# Patient Record
Sex: FEMALE | Race: WHITE | Employment: FULL TIME | URBAN - METROPOLITAN AREA
[De-identification: names, ages, dates, MRNs, and addresses within clinical notes are randomized per-mention and may not be internally consistent; named-entity substitution may affect disease eponyms.]

---

## 2017-01-05 PROCEDURE — 82533 TOTAL CORTISOL: CPT | Performed by: FAMILY MEDICINE

## 2017-01-05 PROCEDURE — 84480 ASSAY TRIIODOTHYRONINE (T3): CPT | Performed by: FAMILY MEDICINE

## 2017-04-03 ENCOUNTER — HOSPITAL ENCOUNTER (OUTPATIENT)
Dept: MAMMOGRAPHY | Age: 52
Discharge: HOME | End: 2017-04-03
Attending: OBSTETRICS & GYNECOLOGY
Payer: COMMERCIAL

## 2017-04-03 DIAGNOSIS — Z12.31 VISIT FOR SCREENING MAMMOGRAM: ICD-10-CM

## 2017-04-03 PROCEDURE — 77067 SCR MAMMO BI INCL CAD: CPT

## 2017-06-29 PROCEDURE — 83002 ASSAY OF GONADOTROPIN (LH): CPT | Performed by: FAMILY MEDICINE

## 2017-06-29 PROCEDURE — 84144 ASSAY OF PROGESTERONE: CPT | Performed by: FAMILY MEDICINE

## 2017-06-29 PROCEDURE — 84480 ASSAY TRIIODOTHYRONINE (T3): CPT | Performed by: FAMILY MEDICINE

## 2017-06-29 PROCEDURE — 83001 ASSAY OF GONADOTROPIN (FSH): CPT | Performed by: FAMILY MEDICINE

## 2017-06-29 PROCEDURE — 82533 TOTAL CORTISOL: CPT | Performed by: FAMILY MEDICINE

## 2017-06-29 PROCEDURE — 82670 ASSAY OF TOTAL ESTRADIOL: CPT | Performed by: FAMILY MEDICINE

## 2017-06-29 PROCEDURE — 82627 DEHYDROEPIANDROSTERONE: CPT | Performed by: FAMILY MEDICINE

## 2017-07-24 PROBLEM — E03.8 SUBCLINICAL HYPOTHYROIDISM: Status: ACTIVE | Noted: 2017-07-24

## 2017-08-22 PROBLEM — H81.11 BENIGN PAROXYSMAL VERTIGO OF RIGHT EAR: Status: ACTIVE | Noted: 2017-08-22

## 2017-12-21 ENCOUNTER — OFFICE VISIT (OUTPATIENT)
Dept: OBGYN CLINIC | Facility: CLINIC | Age: 52
End: 2017-12-21

## 2017-12-21 VITALS
BODY MASS INDEX: 28.66 KG/M2 | WEIGHT: 172 LBS | RESPIRATION RATE: 16 BRPM | HEIGHT: 65 IN | DIASTOLIC BLOOD PRESSURE: 80 MMHG | SYSTOLIC BLOOD PRESSURE: 130 MMHG | HEART RATE: 88 BPM

## 2017-12-21 DIAGNOSIS — Z01.419 ENCOUNTER FOR WELL WOMAN EXAM WITH ROUTINE GYNECOLOGICAL EXAM: Primary | ICD-10-CM

## 2017-12-21 DIAGNOSIS — Z12.39 BREAST CANCER SCREENING: ICD-10-CM

## 2017-12-21 DIAGNOSIS — N39.3 STRESS INCONTINENCE: ICD-10-CM

## 2017-12-21 PROCEDURE — 87624 HPV HI-RISK TYP POOLED RSLT: CPT | Performed by: OBSTETRICS & GYNECOLOGY

## 2017-12-21 PROCEDURE — 88175 CYTOPATH C/V AUTO FLUID REDO: CPT | Performed by: OBSTETRICS & GYNECOLOGY

## 2017-12-21 PROCEDURE — 99396 PREV VISIT EST AGE 40-64: CPT | Performed by: OBSTETRICS & GYNECOLOGY

## 2017-12-21 NOTE — PROGRESS NOTES
Sadie Ivory is a 47year old female  No LMP recorded. Patient has had an ablation.  Patient presents with:  Wellness Visit: annual  .patient has been coughing on and off for 9 months, no improvement with treatment, seeing pulmonologist now  [facility]       SOCIAL HISTORY:    Social History  Social History   Marital status:   Spouse name: N/A    Years of education: N/A  Number of children: N/A     Occupational History  None on file     Social History Main Topics   Smoking status:  Form Comment:Used many years ago and was sick      Review of Systems:  Constitutional:  Denies fatigue, night sweats, hot flashes  Eyes:  denies blurred or double vision  Cardiovascular:  denies chest pain or palpitations  Respiratory:  denies shortness of gregory THINPREP PAP SMEAR B; Future  -     HPV HIGH RISK , THIN PREP COLLECTION; Future  -     Cancel: OP REFERRAL TO [facility]    Breast cancer screening  -     [facility] SCREENING BILAT (CPT=77067);  Future  -     Cancel: OP REFERRAL TO [facility]

## 2018-02-26 PROCEDURE — 83001 ASSAY OF GONADOTROPIN (FSH): CPT | Performed by: FAMILY MEDICINE

## 2018-02-26 PROCEDURE — 36415 COLL VENOUS BLD VENIPUNCTURE: CPT | Performed by: FAMILY MEDICINE

## 2018-02-28 ENCOUNTER — TELEPHONE (OUTPATIENT)
Dept: OBGYN CLINIC | Facility: CLINIC | Age: 53
End: 2018-02-28

## 2018-02-28 NOTE — TELEPHONE ENCOUNTER
Per pt she had her blood work done on Monday by dr Jeb Layton. She got her results and her fsh levels are elevated. Her pcp advised her to talk to dr CAMACHO to see what she recommends to do next. Please advise and call pt.  Thanks

## 2018-03-01 ENCOUNTER — OFFICE VISIT (OUTPATIENT)
Dept: OBGYN CLINIC | Facility: CLINIC | Age: 53
End: 2018-03-01

## 2018-03-01 VITALS
HEART RATE: 72 BPM | BODY MASS INDEX: 29.77 KG/M2 | SYSTOLIC BLOOD PRESSURE: 116 MMHG | HEIGHT: 63 IN | WEIGHT: 168 LBS | DIASTOLIC BLOOD PRESSURE: 64 MMHG

## 2018-03-01 DIAGNOSIS — Z01.419 WELL WOMAN EXAM WITH ROUTINE GYNECOLOGICAL EXAM: Primary | ICD-10-CM

## 2018-03-01 PROCEDURE — 99213 OFFICE O/P EST LOW 20 MIN: CPT | Performed by: OBSTETRICS & GYNECOLOGY

## 2018-03-01 RX ORDER — ESTRADIOL 1 MG/1
1 TABLET ORAL DAILY
Qty: 90 TABLET | Refills: 4 | Status: SHIPPED | OUTPATIENT
Start: 2018-03-01 | End: 2019-01-04

## 2018-03-01 NOTE — PROGRESS NOTES
Planes of hot flashes and night sweats unable to sleep sleep. Women's health initiative risk benefit options were discussed with her she wishes to go for hormone replacement therapy.

## 2018-04-04 ENCOUNTER — HOSPITAL ENCOUNTER (OUTPATIENT)
Dept: MAMMOGRAPHY | Age: 53
Discharge: HOME | End: 2018-04-04
Attending: OBSTETRICS & GYNECOLOGY
Payer: COMMERCIAL

## 2018-04-04 DIAGNOSIS — Z12.39 BREAST CANCER SCREENING: ICD-10-CM

## 2018-04-04 PROCEDURE — 77067 SCR MAMMO BI INCL CAD: CPT | Performed by: OBSTETRICS & GYNECOLOGY

## 2018-05-09 PROBLEM — H81.11 BPPV (BENIGN PAROXYSMAL POSITIONAL VERTIGO), RIGHT: Status: ACTIVE | Noted: 2018-05-09

## 2018-07-19 PROCEDURE — 82627 DEHYDROEPIANDROSTERONE: CPT | Performed by: FAMILY MEDICINE

## 2018-07-19 PROCEDURE — 83001 ASSAY OF GONADOTROPIN (FSH): CPT | Performed by: FAMILY MEDICINE

## 2018-07-19 PROCEDURE — 84144 ASSAY OF PROGESTERONE: CPT | Performed by: FAMILY MEDICINE

## 2018-07-19 PROCEDURE — 82670 ASSAY OF TOTAL ESTRADIOL: CPT | Performed by: FAMILY MEDICINE

## 2018-07-19 PROCEDURE — 84480 ASSAY TRIIODOTHYRONINE (T3): CPT | Performed by: FAMILY MEDICINE

## 2018-07-19 PROCEDURE — 83002 ASSAY OF GONADOTROPIN (LH): CPT | Performed by: FAMILY MEDICINE

## 2018-07-19 PROCEDURE — 82533 TOTAL CORTISOL: CPT | Performed by: FAMILY MEDICINE

## 2018-08-17 PROCEDURE — 87329 GIARDIA AG IA: CPT | Performed by: INTERNAL MEDICINE

## 2018-08-17 PROCEDURE — 87272 CRYPTOSPORIDIUM AG IF: CPT | Performed by: INTERNAL MEDICINE

## 2018-08-17 PROCEDURE — 87207 SMEAR SPECIAL STAIN: CPT | Performed by: INTERNAL MEDICINE

## 2018-08-17 PROCEDURE — 89055 LEUKOCYTE ASSESSMENT FECAL: CPT | Performed by: INTERNAL MEDICINE

## 2018-08-17 PROCEDURE — 87046 STOOL CULTR AEROBIC BACT EA: CPT | Performed by: INTERNAL MEDICINE

## 2018-08-17 PROCEDURE — 36415 COLL VENOUS BLD VENIPUNCTURE: CPT | Performed by: INTERNAL MEDICINE

## 2018-08-17 PROCEDURE — 87015 SPECIMEN INFECT AGNT CONCNTJ: CPT | Performed by: INTERNAL MEDICINE

## 2018-08-17 PROCEDURE — 87045 FECES CULTURE AEROBIC BACT: CPT | Performed by: INTERNAL MEDICINE

## 2018-09-04 PROCEDURE — 88305 TISSUE EXAM BY PATHOLOGIST: CPT | Performed by: INTERNAL MEDICINE

## 2018-11-21 ENCOUNTER — HOSPITAL ENCOUNTER (EMERGENCY)
Facility: HOSPITAL | Age: 53
Discharge: HOME | End: 2018-11-21
Attending: EMERGENCY MEDICINE
Payer: COMMERCIAL

## 2018-11-21 VITALS
RESPIRATION RATE: 20 BRPM | BODY MASS INDEX: 28.35 KG/M2 | HEART RATE: 87 BPM | OXYGEN SATURATION: 97 % | HEIGHT: 63 IN | SYSTOLIC BLOOD PRESSURE: 140 MMHG | WEIGHT: 160 LBS | DIASTOLIC BLOOD PRESSURE: 95 MMHG

## 2018-11-21 DIAGNOSIS — S51.812A LACERATION OF LEFT FOREARM, INITIAL ENCOUNTER: Primary | ICD-10-CM

## 2018-11-21 PROCEDURE — 99283 EMERGENCY DEPT VISIT LOW MDM: CPT

## 2018-11-21 PROCEDURE — 99282 EMERGENCY DEPT VISIT SF MDM: CPT

## 2018-11-21 PROCEDURE — 12002 RPR S/N/AX/GEN/TRNK2.6-7.5CM: CPT

## 2018-11-22 NOTE — ED INITIAL ASSESSMENT (HPI)
Accidental laceration to the right forearm from a ceramic dish approximately 45 minutes prior to arrival. Bleeding well controlled.

## 2018-11-22 NOTE — ED PROVIDER NOTES
Patient Seen in: [facility] Emergency Department    History   Patient presents with:  Laceration Abrasion (integumentary)    Stated Complaint: laceration right wrist    HPI    69-year-old female, otherwise healthy, here for evaluation of a laceratio 2002        Years since quittin.8      Smokeless tobacco: Never Used    Alcohol use: Yes      Comment: wine q day    Drug use: No      Review of Systems    Positive for stated complaint: laceration right wrist  Other systems are as noted in HPI. precautions discussed in detail.             Disposition and Plan     Clinical Impression:  Laceration of left forearm, initial encounter  (primary encounter diagnosis)    Disposition:  Discharge  11/21/2018 11:23 pm    Follow-up:  [facility] Emergency

## 2018-11-28 ENCOUNTER — HOSPITAL ENCOUNTER (OUTPATIENT)
Age: 53
Discharge: HOME | End: 2018-11-28
Attending: FAMILY MEDICINE
Payer: COMMERCIAL

## 2018-11-28 VITALS
RESPIRATION RATE: 16 BRPM | DIASTOLIC BLOOD PRESSURE: 91 MMHG | BODY MASS INDEX: 28 KG/M2 | WEIGHT: 160.06 LBS | SYSTOLIC BLOOD PRESSURE: 141 MMHG | TEMPERATURE: 99 F | HEART RATE: 74 BPM | OXYGEN SATURATION: 100 %

## 2018-11-28 DIAGNOSIS — Z48.02 ENCOUNTER FOR REMOVAL OF SUTURES: Primary | ICD-10-CM

## 2018-11-28 NOTE — ED PROVIDER NOTES
Patient Seen in: [facility]    History   Patient presents with:   Follow - Up: suture removal    Stated Complaint: suture removal     HPI    46year old female presents for suture removal. States she sustained laceration on 2002        Years since quittin.9      Smokeless tobacco: Never Used    Alcohol use: Yes      Comment: wine q day    Drug use: No      Review of Systems    Positive for stated complaint: suture removal   Other systems are as noted in HPI.   Constit

## 2019-01-04 ENCOUNTER — OFFICE VISIT (OUTPATIENT)
Dept: OBGYN CLINIC | Facility: CLINIC | Age: 54
End: 2019-01-04
Payer: COMMERCIAL

## 2019-01-04 VITALS
BODY MASS INDEX: 28.7 KG/M2 | HEIGHT: 63 IN | WEIGHT: 162 LBS | SYSTOLIC BLOOD PRESSURE: 118 MMHG | HEART RATE: 88 BPM | DIASTOLIC BLOOD PRESSURE: 68 MMHG

## 2019-01-04 DIAGNOSIS — Z12.4 CERVICAL CANCER SCREENING: ICD-10-CM

## 2019-01-04 DIAGNOSIS — Z12.39 BREAST CANCER SCREENING: ICD-10-CM

## 2019-01-04 DIAGNOSIS — Z01.419 ENCOUNTER FOR WELL WOMAN EXAM WITH ROUTINE GYNECOLOGICAL EXAM: Primary | ICD-10-CM

## 2019-01-04 PROCEDURE — 88175 CYTOPATH C/V AUTO FLUID REDO: CPT | Performed by: OBSTETRICS & GYNECOLOGY

## 2019-01-04 PROCEDURE — 87624 HPV HI-RISK TYP POOLED RSLT: CPT | Performed by: OBSTETRICS & GYNECOLOGY

## 2019-01-04 PROCEDURE — 99396 PREV VISIT EST AGE 40-64: CPT | Performed by: OBSTETRICS & GYNECOLOGY

## 2019-01-04 RX ORDER — ESTRADIOL 1 MG/1
1 TABLET ORAL DAILY
Qty: 90 TABLET | Refills: 4 | Status: SHIPPED | OUTPATIENT
Start: 2019-01-04 | End: 2019-08-15

## 2019-01-06 LAB — HPV I/H RISK 1 DNA SPEC QL NAA+PROBE: NEGATIVE

## 2019-01-08 PROBLEM — J45.21 MILD INTERMITTENT REACTIVE AIRWAY DISEASE WITH ACUTE EXACERBATION (HCC): Status: ACTIVE | Noted: 2019-01-08

## 2019-01-08 PROBLEM — J45.21 MILD INTERMITTENT REACTIVE AIRWAY DISEASE WITH ACUTE EXACERBATION: Status: ACTIVE | Noted: 2019-01-08

## 2019-03-06 RX ORDER — ESTRADIOL 1 MG/1
TABLET ORAL
Qty: 90 TABLET | Refills: 0 | Status: SHIPPED | OUTPATIENT
Start: 2019-03-06 | End: 2019-06-19

## 2019-04-05 ENCOUNTER — HOSPITAL ENCOUNTER (OUTPATIENT)
Dept: MAMMOGRAPHY | Age: 54
Discharge: HOME | End: 2019-04-05
Attending: OBSTETRICS & GYNECOLOGY
Payer: COMMERCIAL

## 2019-04-05 DIAGNOSIS — Z12.39 BREAST CANCER SCREENING: ICD-10-CM

## 2019-04-05 PROCEDURE — 77067 SCR MAMMO BI INCL CAD: CPT | Performed by: OBSTETRICS & GYNECOLOGY

## 2019-04-05 PROCEDURE — 77063 BREAST TOMOSYNTHESIS BI: CPT | Performed by: OBSTETRICS & GYNECOLOGY

## 2019-04-29 PROBLEM — R07.89 ATYPICAL CHEST PAIN: Status: ACTIVE | Noted: 2019-04-29

## 2019-04-29 PROBLEM — Z82.49 FAMILY HISTORY OF EARLY CAD: Status: ACTIVE | Noted: 2019-04-29

## 2019-06-20 RX ORDER — ESTRADIOL 1 MG/1
TABLET ORAL
Qty: 90 TABLET | Refills: 1 | Status: SHIPPED | OUTPATIENT
Start: 2019-06-20 | End: 2019-08-15

## 2020-03-03 ENCOUNTER — TELEPHONE (OUTPATIENT)
Dept: OBGYN CLINIC | Facility: CLINIC | Age: 55
End: 2020-03-03

## 2020-03-03 NOTE — TELEPHONE ENCOUNTER
Just called pt to let her know she missed her appt on 03- @ 1:30 pm w/ dr CAMACHO. Lmtcbtrs if needed.  Thanks

## 2020-05-27 ENCOUNTER — OFFICE VISIT (OUTPATIENT)
Dept: OBGYN CLINIC | Facility: CLINIC | Age: 55
End: 2020-05-27
Payer: COMMERCIAL

## 2020-05-27 VITALS
BODY MASS INDEX: 26 KG/M2 | HEART RATE: 89 BPM | DIASTOLIC BLOOD PRESSURE: 72 MMHG | SYSTOLIC BLOOD PRESSURE: 120 MMHG | HEIGHT: 63 IN

## 2020-05-27 DIAGNOSIS — Z12.31 ENCOUNTER FOR SCREENING MAMMOGRAM FOR MALIGNANT NEOPLASM OF BREAST: Primary | ICD-10-CM

## 2020-05-27 PROCEDURE — 99396 PREV VISIT EST AGE 40-64: CPT | Performed by: OBSTETRICS & GYNECOLOGY

## 2020-05-27 NOTE — H&P
[facility]  Obstetrics and Gynecology   History & Physical  Established    Chief complaint: No chief complaint on file. Subjective:     HPI: Raven Lam is a 53year old  with LMP No LMP recorded. Patient has had an ablation. mouth nightly., Disp: 90 tablet, Rfl: 3  Progesterone Micronized 200 MG Oral Cap, TAKE 1 CAPSULE BY MOUTH EVERY NIGHT., Disp: 90 capsule, Rfl: 3  estradiol (ESTRACE) 1 MG Oral Tab, Take 1 tablet (1 mg total) by mouth daily. , Disp: 90 tablet, Rfl: 3  Dapson 12/14, 12/13, 12/13, 11/11, 11/10    negative hpv negative   • Perirectal abscess     Multiple surgeries done   • Sprain of foot joint, left, initial encounter 9/7/2016   • Subclinical hypothyroidism 7/24/2017   • Wears glasses     PRN       PSH:  Past Angelique Alcohol use: Yes        Frequency: Monthly or less        Drinks per session: 3 or 4        Binge frequency: Less than monthly        Comment: wine q day      Drug use: No      Sexual activity: Not on file    Lifestyle      Physical activity:        Days p 07/19/2018    MCV 96.8 07/19/2018    MCH 31.6 07/19/2018    MCHC 32.6 07/19/2018    RDW 12.5 07/19/2018     07/19/2018        Lab Results   Component Value Date    GLU 93 07/19/2018    BUN 11 07/19/2018    CREATSERUM 0.84 07/19/2018    GFR >59 03/17

## 2020-05-27 NOTE — PROGRESS NOTES
Justin Nam is a 53year old female  No LMP recorded. Patient has had an ablation. Patient presents with:  Wellness Visit: hot flashes, middle of night pt having leg cramping, pt wasnts to go over lab work that was done on 20  .      She i negative   • Perirectal abscess     Multiple surgeries done   • Sprain of foot joint, left, initial encounter 9/7/2016   • Subclinical hypothyroidism 7/24/2017   • Wears glasses     PRN       SURGICAL HISTORY:  Past Surgical History:   Procedure Laterality 1/15/1978        Quit date: 2002        Years since quittin.4      Smokeless tobacco: Never Used    Substance and Sexual Activity      Alcohol use: Yes        Frequency: Monthly or less        Drinks per session: 3 or 4        Binge frequency: Les Cap, Take 1 tablet by mouth nightly., Disp: 30 capsule, Rfl: 11  •  Fluticasone Furoate (ARNUITY ELLIPTA) 100 MCG/ACT Inhalation Aerosol Powder, Breath Activated, Inhale 1 puff into the lungs daily. , Disp: 1 each, Rfl: 2  •  Omeprazole 40 MG Oral Capsule D constipation  Genitourinary:  denies dysuria, incontinence, abnormal vaginal discharge, vaginal itching  Skin/Breast:  Denies any breast pain, lumps, or discharge. Neurological:  denies headaches, extremity weakness or numbness.       PHYSICAL EXAM:   Con

## 2020-06-01 ENCOUNTER — TELEPHONE (OUTPATIENT)
Dept: OBGYN CLINIC | Facility: CLINIC | Age: 55
End: 2020-06-01

## 2020-06-12 ENCOUNTER — TELEPHONE (OUTPATIENT)
Dept: OBGYN CLINIC | Facility: CLINIC | Age: 55
End: 2020-06-12

## 2020-06-12 DIAGNOSIS — N95.1 MENOPAUSAL SYMPTOMS: ICD-10-CM

## 2020-06-18 NOTE — TELEPHONE ENCOUNTER
Patient informed that the PA for Freddy Morataya was denied. She would like to know if she should resume taking Estradiol tabs and Progesterone plus Estrovera. She has also ordered Relizen to try. Will check with Dr. Moses Barboza and call patient back.

## 2020-06-24 RX ORDER — ESTRADIOL 1 MG/1
1 TABLET ORAL DAILY
Qty: 90 TABLET | Refills: 3 | Status: SHIPPED | OUTPATIENT
Start: 2020-06-24 | End: 2021-06-21

## 2020-06-25 ENCOUNTER — HOSPITAL ENCOUNTER (OUTPATIENT)
Dept: MAMMOGRAPHY | Age: 55
Discharge: HOME | End: 2020-06-25
Attending: OBSTETRICS & GYNECOLOGY
Payer: COMMERCIAL

## 2020-06-25 DIAGNOSIS — Z12.31 ENCOUNTER FOR SCREENING MAMMOGRAM FOR MALIGNANT NEOPLASM OF BREAST: ICD-10-CM

## 2020-06-25 PROCEDURE — 77063 BREAST TOMOSYNTHESIS BI: CPT | Performed by: OBSTETRICS & GYNECOLOGY

## 2020-06-25 PROCEDURE — 77067 SCR MAMMO BI INCL CAD: CPT | Performed by: OBSTETRICS & GYNECOLOGY

## 2021-04-09 DIAGNOSIS — Z23 NEED FOR VACCINATION: ICD-10-CM

## 2021-05-14 ENCOUNTER — TELEPHONE (OUTPATIENT)
Dept: OBGYN CLINIC | Facility: CLINIC | Age: 56
End: 2021-05-14

## 2021-05-14 NOTE — TELEPHONE ENCOUNTER
Pt called to schedule her WWE and asked if the nurse can call her back. Pt states she has been dealing with skin irritation/pimples on her bikini line and the products she is using just are not helping. Please advise.

## 2021-05-14 NOTE — TELEPHONE ENCOUNTER
Spoke with patient. She has been dealing with scabies for a while is now taking an oral medication from her dermatologist that appears to be helping. She is having purplish boils and \"breakouts in her bikini area that she would like a recommendation for.

## 2021-06-21 ENCOUNTER — OFFICE VISIT (OUTPATIENT)
Dept: OBGYN CLINIC | Facility: CLINIC | Age: 56
End: 2021-06-21
Payer: COMMERCIAL

## 2021-06-21 VITALS
HEIGHT: 63 IN | DIASTOLIC BLOOD PRESSURE: 62 MMHG | SYSTOLIC BLOOD PRESSURE: 124 MMHG | BODY MASS INDEX: 28 KG/M2 | HEART RATE: 87 BPM

## 2021-06-21 DIAGNOSIS — Z12.31 ENCOUNTER FOR SCREENING MAMMOGRAM FOR BREAST CANCER: Primary | ICD-10-CM

## 2021-06-21 DIAGNOSIS — Z01.419 WELL WOMAN EXAM WITH ROUTINE GYNECOLOGICAL EXAM: ICD-10-CM

## 2021-06-21 DIAGNOSIS — N95.1 MENOPAUSAL SYMPTOMS: ICD-10-CM

## 2021-06-21 PROCEDURE — 99396 PREV VISIT EST AGE 40-64: CPT | Performed by: OBSTETRICS & GYNECOLOGY

## 2021-06-21 PROCEDURE — 3074F SYST BP LT 130 MM HG: CPT | Performed by: OBSTETRICS & GYNECOLOGY

## 2021-06-21 PROCEDURE — 3008F BODY MASS INDEX DOCD: CPT | Performed by: OBSTETRICS & GYNECOLOGY

## 2021-06-21 PROCEDURE — 3078F DIAST BP <80 MM HG: CPT | Performed by: OBSTETRICS & GYNECOLOGY

## 2021-06-21 RX ORDER — PROGESTERONE 200 MG/1
200 CAPSULE ORAL AS DIRECTED
Qty: 90 CAPSULE | Refills: 5 | Status: SHIPPED | OUTPATIENT
Start: 2021-06-21 | End: 2021-11-15

## 2021-06-21 RX ORDER — ESTRADIOL 1 MG/1
1 TABLET ORAL DAILY
Qty: 90 TABLET | Refills: 3 | Status: SHIPPED | OUTPATIENT
Start: 2021-06-21

## 2021-06-21 NOTE — PROGRESS NOTES
Alison Trevino is a 63year old female  No LMP recorded. Patient has had an ablation. Patient presents with:  Wellness Visit  . Bleeding no spotting. She had scabies. It took 6 weeks to be treated. She has had 2 colonoscopies.   She is compl Colonoscopy & polypectomy  10/14    polyp- repeat 5 yrs (adenoma)   • Colonoscopy,biopsy N/A 10/14/2014    Procedure: ESOPHAGOGASTRODUODENOSCOPY, COLONOSCOPY, POSSIBLE BIOPSY, POSSIBLE POLYPECTOMY 63369,71770;  Surgeon: Reggie Goodell, MD;  Location:  Concern: Not Asked        Special Diet: Not Asked        Back Care: Not Asked        Exercise: Yes          5 d/wk        Bike Helmet: Not Asked        Seat Belt: Yes        Self-Exams: Not Asked    Social History Narrative      Not on file    Social Deter 5 % External Cream, Apply TOP from neck down at night and then wash off in the AM. . Repeat in a week if necessary. , Disp: 60 g, Rfl: 1  •  Lindane 1 % External Shampoo, Apply to skin from neck down at night and shower in the AM 10-14 hours later. , Disp: 6 numbness.       PHYSICAL EXAM:   Constitutional: well developed, well nourished  Head/Face: normocephalic  Neck/Thyroid: thyroid symmetric, no thyromegaly, no nodules, no adenopathy  Breast: normal without palpable masses, tenderness, asymmetry, nipple disc

## 2021-07-09 ENCOUNTER — TELEPHONE (OUTPATIENT)
Dept: OBGYN CLINIC | Facility: CLINIC | Age: 56
End: 2021-07-09

## 2021-07-09 NOTE — TELEPHONE ENCOUNTER
April called from [facility] asking for clarification on a prescription sent in for pt. Please call to advise.

## 2021-07-19 ENCOUNTER — HOSPITAL ENCOUNTER (OUTPATIENT)
Dept: MAMMOGRAPHY | Age: 56
Discharge: HOME | End: 2021-07-19
Attending: OBSTETRICS & GYNECOLOGY
Payer: COMMERCIAL

## 2021-07-19 DIAGNOSIS — Z12.31 ENCOUNTER FOR SCREENING MAMMOGRAM FOR BREAST CANCER: ICD-10-CM

## 2021-07-19 PROCEDURE — 77063 BREAST TOMOSYNTHESIS BI: CPT | Performed by: OBSTETRICS & GYNECOLOGY

## 2021-07-19 PROCEDURE — 77067 SCR MAMMO BI INCL CAD: CPT | Performed by: OBSTETRICS & GYNECOLOGY

## 2021-07-20 NOTE — PROGRESS NOTES
Patient notified per radiology department. They will call the patient back to schedule an Appointment for additional views.

## 2021-07-23 ENCOUNTER — HOSPITAL ENCOUNTER (OUTPATIENT)
Dept: MAMMOGRAPHY | Facility: HOSPITAL | Age: 56
Discharge: HOME | End: 2021-07-23
Attending: OBSTETRICS & GYNECOLOGY
Payer: COMMERCIAL

## 2021-07-23 DIAGNOSIS — R92.2 INCONCLUSIVE MAMMOGRAM: ICD-10-CM

## 2021-07-23 PROCEDURE — 77061 BREAST TOMOSYNTHESIS UNI: CPT | Performed by: OBSTETRICS & GYNECOLOGY

## 2021-07-23 PROCEDURE — 76642 ULTRASOUND BREAST LIMITED: CPT | Performed by: OBSTETRICS & GYNECOLOGY

## 2021-07-23 PROCEDURE — 77065 DX MAMMO INCL CAD UNI: CPT | Performed by: OBSTETRICS & GYNECOLOGY

## 2021-07-30 ENCOUNTER — TELEPHONE (OUTPATIENT)
Dept: OBGYN CLINIC | Facility: CLINIC | Age: 56
End: 2021-07-30

## 2021-07-30 DIAGNOSIS — R92.2 BREAST DENSITY: ICD-10-CM

## 2021-07-30 DIAGNOSIS — R92.2 INCONCLUSIVE MAMMOGRAM: Primary | ICD-10-CM

## 2021-07-30 DIAGNOSIS — R92.8 ABNORMAL ULTRASOUND OF BREAST: ICD-10-CM

## 2021-07-30 NOTE — TELEPHONE ENCOUNTER
Request for breast mri order received. Ordered placed and routed for signature. Per Lady Tono RN will need PA.

## 2021-08-13 NOTE — TELEPHONE ENCOUNTER
PA obtained, order number [id number].  Left message for [facility] with authorization number

## 2021-08-13 NOTE — TELEPHONE ENCOUNTER
Beck Paula called to request PA for breast MRI scheduled for Monday. Call Beck Paula pack with authorization once received. Transporter

## 2021-08-16 ENCOUNTER — HOSPITAL ENCOUNTER (OUTPATIENT)
Dept: MRI IMAGING | Facility: HOSPITAL | Age: 56
Discharge: HOME | End: 2021-08-16
Attending: OBSTETRICS & GYNECOLOGY
Payer: COMMERCIAL

## 2021-08-16 DIAGNOSIS — R92.2 INCONCLUSIVE MAMMOGRAM: ICD-10-CM

## 2021-08-16 PROCEDURE — 77049 MRI BREAST C-+ W/CAD BI: CPT | Performed by: OBSTETRICS & GYNECOLOGY

## 2021-08-16 PROCEDURE — A9575 INJ GADOTERATE MEGLUMI 0.1ML: HCPCS | Performed by: OBSTETRICS & GYNECOLOGY

## 2021-08-17 ENCOUNTER — TELEPHONE (OUTPATIENT)
Dept: CT IMAGING | Facility: HOSPITAL | Age: 56
End: 2021-08-17

## 2021-08-17 DIAGNOSIS — R92.8 ABNORMAL MRI, BREAST: Primary | ICD-10-CM

## 2021-08-17 NOTE — PROGRESS NOTES
Patient notified of mammogram results and recommendation for biopsy per Breast Care Coordinator. Order placed.

## 2021-08-17 NOTE — TELEPHONE ENCOUNTER
This Breast Care RN assisted Dr. John Morrison (via MRI recommendation) for a Right Breast 3D Tony stereotactic biopsy for architectural distortion. Procedure reviewed and all questions answered. Emotional and educational support given.    On the day of the biops

## 2021-09-09 ENCOUNTER — HOSPITAL ENCOUNTER (OUTPATIENT)
Dept: MAMMOGRAPHY | Facility: HOSPITAL | Age: 56
Discharge: HOME | End: 2021-09-09
Attending: OBSTETRICS & GYNECOLOGY
Payer: COMMERCIAL

## 2021-09-09 DIAGNOSIS — R92.8 ABNORMAL MRI, BREAST: ICD-10-CM

## 2021-09-09 PROCEDURE — 88305 TISSUE EXAM BY PATHOLOGIST: CPT | Performed by: OBSTETRICS & GYNECOLOGY

## 2021-09-09 PROCEDURE — 19499 UNLISTED PROCEDURE BREAST: CPT | Performed by: OBSTETRICS & GYNECOLOGY

## 2021-09-09 PROCEDURE — 88341 IMHCHEM/IMCYTCHM EA ADD ANTB: CPT | Performed by: OBSTETRICS & GYNECOLOGY

## 2021-09-09 PROCEDURE — 88342 IMHCHEM/IMCYTCHM 1ST ANTB: CPT | Performed by: OBSTETRICS & GYNECOLOGY

## 2021-09-13 NOTE — PROGRESS NOTES
Patient aware of right breast Biopsy results and recommendation to follow up with Dr Dionne Luther. Patient verbalized understanding.

## 2021-09-13 NOTE — IMAGING NOTE
1036 : Spoke with Nadia Estevez post stereotactic right breast biopsy. Ms. See Watson identified with name and date of birth. Introduced myself as breast care coordinator. Reinforced post biopsy care and instruction.   Ms. See Watson  denies any issues

## 2021-09-27 ENCOUNTER — OFFICE VISIT (OUTPATIENT)
Dept: SURGERY | Facility: CLINIC | Age: 56
End: 2021-09-27
Payer: COMMERCIAL

## 2021-09-27 VITALS — TEMPERATURE: 97 F

## 2021-09-27 DIAGNOSIS — N64.89 RADIAL SCAR OF RIGHT BREAST: Primary | ICD-10-CM

## 2021-09-27 PROCEDURE — 99243 OFF/OP CNSLTJ NEW/EST LOW 30: CPT | Performed by: SURGERY

## 2021-09-27 NOTE — H&P
New Patient Visit Note       Active Problems      1. Radial scar of right breast        Chief Complaint   Patient presents with:  Breast Problem: [facility] PT ref by Dr Chuckie Hough for RT breast radial scar -- Denies pain and discomfort.  States still bruised from the 11/10    negative hpv negative   • Perirectal abscess     Multiple surgeries done   • Sprain of foot joint, left, initial encounter 9/7/2016   • Subclinical hypothyroidism 7/24/2017   • Wears glasses     PRN     Past Surgical History:   Procedure Lateralit Smokeless tobacco: Never Used    Vaping Use      Vaping Use: Never used    Substance and Sexual Activity      Alcohol use: Yes        Comment: wine q day      Drug use: No    Other Topics      Concerns:        Caffeine Concern: No          coffee        Ex MCG/ACT Inhalation Aero Soln, INHALE 2 PUFFS INTO THE LUNGS EVERY 4 HOURS AS NEEDED FOR WHEEZING, Disp: 8.5 g, Rfl: 1  •  Calcium Carbonate-Vitamin D (CALCIUM + D OR), Take by mouth daily. , Disp: , Rfl:   •  Cyanocobalamin (B-12 OR), Take by mouth daily. equal, round, and reactive to light. Neck:      Vascular: No JVD. Trachea: Trachea normal.   Cardiovascular:      Rate and Rhythm: Normal rate and regular rhythm. Heart sounds: S1 normal and S2 normal. No murmur heard.       Pulmonary:      Effo vague nodularity   posterior to the nipple.  Ultrasound demonstrates a benign cyst 0500 hours 5 mm x 5 mm x 3 mm.  There is a benign cyst 0300 hours retroareolar 5 mm x 3 mm x 3 mm.       MRI is recommended for further evaluation of spiculated density with correlate to the finding of architectural distortion seen on 3D tomosynthesis.  Therefore 3D tomographic stereotactic biopsy of the right breast area of architectural distortion is recommended for further evaluation.       BI-RADS CATEGORY:   DIAGNOSTIC CA

## 2021-11-04 ENCOUNTER — TELEPHONE (OUTPATIENT)
Dept: GENERAL RADIOLOGY | Facility: HOSPITAL | Age: 56
End: 2021-11-04

## 2021-11-15 RX ORDER — ACETAMINOPHEN 500 MG
1000 TABLET ORAL ONCE
Status: CANCELLED | OUTPATIENT
Start: 2021-11-15 | End: 2021-11-15

## 2021-11-16 ENCOUNTER — LAB ENCOUNTER (OUTPATIENT)
Dept: LAB | Facility: HOSPITAL | Age: 56
End: 2021-11-16
Attending: SURGERY
Payer: COMMERCIAL

## 2021-11-16 DIAGNOSIS — N64.89 RADIAL SCAR OF RIGHT BREAST: ICD-10-CM

## 2021-11-18 ENCOUNTER — HOSPITAL ENCOUNTER (OUTPATIENT)
Facility: HOSPITAL | Age: 56
Setting detail: HOSPITAL OUTPATIENT SURGERY
Discharge: HOME | End: 2021-11-18
Attending: SURGERY | Admitting: SURGERY
Payer: COMMERCIAL

## 2021-11-18 ENCOUNTER — HOSPITAL ENCOUNTER (OUTPATIENT)
Dept: MAMMOGRAPHY | Facility: HOSPITAL | Age: 56
Discharge: HOME | End: 2021-11-18
Attending: SURGERY
Payer: COMMERCIAL

## 2021-11-18 ENCOUNTER — ANESTHESIA (OUTPATIENT)
Dept: SURGERY | Facility: HOSPITAL | Age: 56
End: 2021-11-18
Payer: COMMERCIAL

## 2021-11-18 ENCOUNTER — ANESTHESIA EVENT (OUTPATIENT)
Dept: SURGERY | Facility: HOSPITAL | Age: 56
End: 2021-11-18
Payer: COMMERCIAL

## 2021-11-18 VITALS
BODY MASS INDEX: 30.08 KG/M2 | DIASTOLIC BLOOD PRESSURE: 89 MMHG | HEIGHT: 63 IN | WEIGHT: 169.75 LBS | SYSTOLIC BLOOD PRESSURE: 135 MMHG | TEMPERATURE: 98 F | HEART RATE: 93 BPM | RESPIRATION RATE: 14 BRPM | OXYGEN SATURATION: 96 %

## 2021-11-18 DIAGNOSIS — N64.89 RADIAL SCAR OF RIGHT BREAST: Primary | ICD-10-CM

## 2021-11-18 DIAGNOSIS — N64.89 RADIAL SCAR OF RIGHT BREAST: ICD-10-CM

## 2021-11-18 PROCEDURE — 88342 IMHCHEM/IMCYTCHM 1ST ANTB: CPT | Performed by: SURGERY

## 2021-11-18 PROCEDURE — 76098 X-RAY EXAM SURGICAL SPECIMEN: CPT | Performed by: SURGERY

## 2021-11-18 PROCEDURE — 19281 PERQ DEVICE BREAST 1ST IMAG: CPT | Performed by: SURGERY

## 2021-11-18 PROCEDURE — 88307 TISSUE EXAM BY PATHOLOGIST: CPT | Performed by: SURGERY

## 2021-11-18 PROCEDURE — 88341 IMHCHEM/IMCYTCHM EA ADD ANTB: CPT | Performed by: SURGERY

## 2021-11-18 PROCEDURE — 0JB60ZX EXCISION OF CHEST SUBCUTANEOUS TISSUE AND FASCIA, OPEN APPROACH, DIAGNOSTIC: ICD-10-PCS | Performed by: SURGERY

## 2021-11-18 RX ORDER — ACETAMINOPHEN 500 MG
1000 TABLET ORAL EVERY 6 HOURS PRN
COMMUNITY
End: 2021-11-18

## 2021-11-18 RX ORDER — KETOROLAC TROMETHAMINE 30 MG/ML
30 INJECTION, SOLUTION INTRAMUSCULAR; INTRAVENOUS ONCE
Status: COMPLETED | OUTPATIENT
Start: 2021-11-18 | End: 2021-11-18

## 2021-11-18 RX ORDER — DIAZEPAM 5 MG/1
5 TABLET ORAL AS NEEDED
Status: DISCONTINUED | OUTPATIENT
Start: 2021-11-18 | End: 2021-11-18 | Stop reason: HOSPADM

## 2021-11-18 RX ORDER — NALOXONE HYDROCHLORIDE 0.4 MG/ML
80 INJECTION, SOLUTION INTRAMUSCULAR; INTRAVENOUS; SUBCUTANEOUS AS NEEDED
Status: DISCONTINUED | OUTPATIENT
Start: 2021-11-18 | End: 2021-11-18

## 2021-11-18 RX ORDER — BUPIVACAINE HYDROCHLORIDE AND EPINEPHRINE 5; 5 MG/ML; UG/ML
INJECTION, SOLUTION EPIDURAL; INTRACAUDAL; PERINEURAL AS NEEDED
Status: DISCONTINUED | OUTPATIENT
Start: 2021-11-18 | End: 2021-11-18 | Stop reason: HOSPADM

## 2021-11-18 RX ORDER — SODIUM CHLORIDE, SODIUM LACTATE, POTASSIUM CHLORIDE, CALCIUM CHLORIDE 600; 310; 30; 20 MG/100ML; MG/100ML; MG/100ML; MG/100ML
INJECTION, SOLUTION INTRAVENOUS CONTINUOUS
Status: DISCONTINUED | OUTPATIENT
Start: 2021-11-18 | End: 2021-11-18

## 2021-11-18 RX ORDER — DEXAMETHASONE SODIUM PHOSPHATE 4 MG/ML
VIAL (ML) INJECTION AS NEEDED
Status: DISCONTINUED | OUTPATIENT
Start: 2021-11-18 | End: 2021-11-18 | Stop reason: SURG

## 2021-11-18 RX ORDER — TRAMADOL HYDROCHLORIDE 50 MG/1
50 TABLET ORAL EVERY 6 HOURS PRN
Qty: 10 TABLET | Refills: 0 | Status: SHIPPED | OUTPATIENT
Start: 2021-11-18 | End: 2021-11-23

## 2021-11-18 RX ORDER — TRAMADOL HYDROCHLORIDE 50 MG/1
50 TABLET ORAL ONCE
Status: COMPLETED | OUTPATIENT
Start: 2021-11-18 | End: 2021-11-18

## 2021-11-18 RX ORDER — LIDOCAINE HYDROCHLORIDE 10 MG/ML
INJECTION, SOLUTION EPIDURAL; INFILTRATION; INTRACAUDAL; PERINEURAL AS NEEDED
Status: DISCONTINUED | OUTPATIENT
Start: 2021-11-18 | End: 2021-11-18 | Stop reason: SURG

## 2021-11-18 RX ORDER — ONDANSETRON 2 MG/ML
4 INJECTION INTRAMUSCULAR; INTRAVENOUS AS NEEDED
Status: DISCONTINUED | OUTPATIENT
Start: 2021-11-18 | End: 2021-11-18

## 2021-11-18 RX ORDER — METOCLOPRAMIDE HYDROCHLORIDE 5 MG/ML
10 INJECTION INTRAMUSCULAR; INTRAVENOUS AS NEEDED
Status: DISCONTINUED | OUTPATIENT
Start: 2021-11-18 | End: 2021-11-18

## 2021-11-18 RX ORDER — ONDANSETRON 2 MG/ML
INJECTION INTRAMUSCULAR; INTRAVENOUS AS NEEDED
Status: DISCONTINUED | OUTPATIENT
Start: 2021-11-18 | End: 2021-11-18 | Stop reason: SURG

## 2021-11-18 RX ORDER — TRAMADOL HYDROCHLORIDE 50 MG/1
100 TABLET ORAL ONCE
Status: COMPLETED | OUTPATIENT
Start: 2021-11-18 | End: 2021-11-18

## 2021-11-18 RX ORDER — CEFAZOLIN SODIUM/WATER 2 G/20 ML
2 SYRINGE (ML) INTRAVENOUS ONCE
Status: COMPLETED | OUTPATIENT
Start: 2021-11-18 | End: 2021-11-18

## 2021-11-18 RX ORDER — MIDAZOLAM HYDROCHLORIDE 1 MG/ML
INJECTION INTRAMUSCULAR; INTRAVENOUS AS NEEDED
Status: DISCONTINUED | OUTPATIENT
Start: 2021-11-18 | End: 2021-11-18 | Stop reason: SURG

## 2021-11-18 RX ORDER — DIPHENHYDRAMINE HYDROCHLORIDE 50 MG/ML
12.5 INJECTION INTRAMUSCULAR; INTRAVENOUS AS NEEDED
Status: DISCONTINUED | OUTPATIENT
Start: 2021-11-18 | End: 2021-11-18

## 2021-11-18 RX ADMIN — SODIUM CHLORIDE, SODIUM LACTATE, POTASSIUM CHLORIDE, CALCIUM CHLORIDE: 600; 310; 30; 20 INJECTION, SOLUTION INTRAVENOUS at 10:41:00

## 2021-11-18 RX ADMIN — CEFAZOLIN SODIUM/WATER 2 G: 2 G/20 ML SYRINGE (ML) INTRAVENOUS at 10:10:00

## 2021-11-18 RX ADMIN — ONDANSETRON 4 MG: 2 INJECTION INTRAMUSCULAR; INTRAVENOUS at 10:13:00

## 2021-11-18 RX ADMIN — DEXAMETHASONE SODIUM PHOSPHATE 4 MG: 4 MG/ML VIAL (ML) INJECTION at 10:13:00

## 2021-11-18 RX ADMIN — LIDOCAINE HYDROCHLORIDE 50 MG: 10 INJECTION, SOLUTION EPIDURAL; INFILTRATION; INTRACAUDAL; PERINEURAL at 10:07:00

## 2021-11-18 RX ADMIN — MIDAZOLAM HYDROCHLORIDE 2 MG: 1 INJECTION INTRAMUSCULAR; INTRAVENOUS at 10:02:00

## 2021-11-18 NOTE — ANESTHESIA PREPROCEDURE EVALUATION
PRE-OP EVALUATION    Patient Name: Laron Her    Admit Diagnosis: Radial scar of right breast [N64.89]    Pre-op Diagnosis: Radial scar of right breast [N64.89]    RIGHT LUMPECTOMY WITH X-RAY LOCALIZATION     Anesthesia Procedure: RIGHT LUMPECTOMY WI at Unknown time  Calcium Carbonate-Vitamin D (CALCIUM + D OR), Take by mouth daily. , Disp: , Rfl: , Past Week at Unknown time  MULTIVITAMINS OR TABS, 1 TABLET DAILY, Disp: , Rfl: , Past Week at Unknown time  clonazePAM 0.125 MG Oral Tablet Dispersible, José Mata Ariana Banks MD;  Location: [address] [place] [address]   • EGD N/A 9/4/2018    Procedure: ESOPHAGOGASTRODUODENOSCOPY, COLONOSCOPY, POSSIBLE BIOPSY, POSSIBLE POLYPECTOMY 49993, 86319;  Surgeon: Ariana Banks MD;  Location: [address] [place] [address] placement of a breathing tube should the patient not breathe well or should MAC not provide adequate sedation for the patient to tolerate the procedure. All questions were answered.   Plan/risks discussed with: patient                Present on Admission:

## 2021-11-18 NOTE — IMAGING NOTE
Assisted Dr. Angelo De La Curz with needle localization of right breast for lumpectomy. Procedure explained and all questions answered. Pt verbalized understanding. Emotional support provided and pt tolerated procedure well with minimal discomfort.  Wire(s) secured wi

## 2021-11-18 NOTE — H&P
History & Physical Examination    Patient Name: Shantanu Villegas  MRN: [id number]  [facility]: [id number]  YOB: 1965    Diagnosis: Right breast radial scar    Present Illness:  The patient is a 54-year-old female seen at the request of her gynecologis Unknown time  Progesterone Micronized 200 MG Oral Cap, TAKE 1 CAPSULE BY MOUTH EVERY NIGHT., Disp: 90 capsule, Rfl: 3, 11/17/2021 at Unknown time  Calcium Carbonate-Vitamin D (CALCIUM + D OR), Take by mouth daily. , Disp: , Rfl: , Past Week at Unknown time disease) 4/3/2012   • H/O cervical biopsy 11/2000   • Mild intermittent reactive airway disease with acute exacerbation 1/8/2019   • Pap smear for cervical cancer screening 12/2015, 12/14, 12/13, 12/13, 11/11, 11/10    negative hpv negative   • Perirectal Smoker        Packs/day: 1.00        Years: 26.00        Pack years: 32        Start date: 1/15/1978        Quit date: 2002        Years since quittin.8      Smokeless tobacco: Never Used    Alcohol use:  Yes      Alcohol/week: 2.0 standard drinks

## 2021-11-18 NOTE — ANESTHESIA POSTPROCEDURE EVALUATION
[facility] Patient Status:  Hospital Outpatient Surgery   Age/Gender 61year old female MRN [id number]   [facility] SURGERY Attending Adelia Chance MD   Hosp Day # 0 PCP Jarek Duran MD       Anesthesia Pos

## 2021-11-18 NOTE — OPERATIVE REPORT
[facility]  Op Note    Janina Davidson Location: OR   CSN [id number] MRN [id number]   Admission Date 11/18/2021 Operation Date 11/18/2021   Attending Physician Elvis Lopez MD Operating Physician Melisa Tee MD     DATE OF OPERATION:  1 closed in 2 layers, using a 3-0 Vicryl in the deep layer and a 4-0 Vicryl in the subcuticular skin. Steri-Strips were placed across the incision as well as a sterile dressing.  The patient tolerated the procedure well and was transferred to the PACU in good

## 2021-11-19 ENCOUNTER — TELEPHONE (OUTPATIENT)
Dept: SURGERY | Facility: CLINIC | Age: 56
End: 2021-11-19

## 2021-11-19 NOTE — TELEPHONE ENCOUNTER
----- Message from Dillon Cantu MD sent at 11/18/2021  9:54 AM CST -----  Pt to have surgery today (lumpectomy). Please call her to set her up for a post-op appointment with me on Tuesday at [facility].     ThanksKADE

## 2021-11-23 ENCOUNTER — OFFICE VISIT (OUTPATIENT)
Dept: HEMATOLOGY/ONCOLOGY | Facility: HOSPITAL | Age: 56
End: 2021-11-23
Attending: SURGERY
Payer: COMMERCIAL

## 2021-11-23 ENCOUNTER — OFFICE VISIT (OUTPATIENT)
Dept: SURGERY | Facility: CLINIC | Age: 56
End: 2021-11-23

## 2021-11-23 VITALS
HEART RATE: 93 BPM | TEMPERATURE: 97 F | RESPIRATION RATE: 16 BRPM | HEIGHT: 62.99 IN | WEIGHT: 172 LBS | SYSTOLIC BLOOD PRESSURE: 143 MMHG | DIASTOLIC BLOOD PRESSURE: 97 MMHG | BODY MASS INDEX: 30.48 KG/M2 | OXYGEN SATURATION: 99 %

## 2021-11-23 DIAGNOSIS — N64.89 RADIAL SCAR OF RIGHT BREAST: Primary | ICD-10-CM

## 2021-11-23 PROCEDURE — 99024 POSTOP FOLLOW-UP VISIT: CPT | Performed by: SURGERY

## 2021-11-23 PROCEDURE — 99211 OFF/OP EST MAY X REQ PHY/QHP: CPT

## 2021-11-23 NOTE — PROGRESS NOTES
Subjective:   Patient ID: Dean Youngblood is a 61year old female who underwent excision of a right breast radial scar, performed November 18, 2021. She has minimal complaints. She is taking ibuprofen for pain control.     HPI    History/Other:   Review distress. Appearance: She is well-developed. She is not diaphoretic. HENT:      Head: Normocephalic and atraumatic. Eyes:      General: No scleral icterus.      Conjunctiva/sclera: Conjunctivae normal.      Pupils: Pupils are equal, round, and react

## 2021-11-23 NOTE — PROGRESS NOTES
Patient presents for follow up visit RT breast lumpectomy with x-ray localization performed on 11/18/21 here to discuss findings. Minimal pain at lumpectomy site taking Tylenol for pain relief. Only took Ultram one time.  Medication and allergies reviewed a

## 2021-11-30 ENCOUNTER — PATIENT OUTREACH (OUTPATIENT)
Dept: SURGERY | Facility: CLINIC | Age: 56
End: 2021-11-30

## 2022-05-24 ENCOUNTER — HOSPITAL ENCOUNTER (OUTPATIENT)
Dept: MAMMOGRAPHY | Facility: HOSPITAL | Age: 57
Discharge: HOME | End: 2022-05-24
Attending: SURGERY
Payer: COMMERCIAL

## 2022-05-24 DIAGNOSIS — N64.89 RADIAL SCAR OF RIGHT BREAST: ICD-10-CM

## 2022-05-24 PROCEDURE — 77066 DX MAMMO INCL CAD BI: CPT | Performed by: SURGERY

## 2022-05-24 PROCEDURE — 77062 BREAST TOMOSYNTHESIS BI: CPT | Performed by: SURGERY

## 2022-05-25 NOTE — PROGRESS NOTES
BREAST EXAM WITH OB-GYN    Future Appointments  6/27/2022  1:00 PM    Ko Butts MD          [facility]        DARIO Baxter

## 2022-08-19 ENCOUNTER — OFFICE VISIT (OUTPATIENT)
Dept: OBGYN CLINIC | Facility: CLINIC | Age: 57
End: 2022-08-19
Payer: COMMERCIAL

## 2022-08-19 VITALS
BODY MASS INDEX: 30.62 KG/M2 | DIASTOLIC BLOOD PRESSURE: 80 MMHG | WEIGHT: 172.81 LBS | SYSTOLIC BLOOD PRESSURE: 132 MMHG | HEART RATE: 84 BPM | HEIGHT: 63 IN

## 2022-08-19 DIAGNOSIS — Z01.419 WELL WOMAN EXAM WITH ROUTINE GYNECOLOGICAL EXAM: Primary | ICD-10-CM

## 2022-08-19 DIAGNOSIS — Z12.4 PAPANICOLAOU SMEAR FOR CERVICAL CANCER SCREENING: ICD-10-CM

## 2022-08-19 DIAGNOSIS — Z12.31 ENCOUNTER FOR SCREENING MAMMOGRAM FOR BREAST CANCER: ICD-10-CM

## 2022-08-19 DIAGNOSIS — N95.1 MENOPAUSAL SYMPTOMS: ICD-10-CM

## 2022-08-19 PROCEDURE — 3075F SYST BP GE 130 - 139MM HG: CPT | Performed by: OBSTETRICS & GYNECOLOGY

## 2022-08-19 PROCEDURE — 99396 PREV VISIT EST AGE 40-64: CPT | Performed by: OBSTETRICS & GYNECOLOGY

## 2022-08-19 PROCEDURE — 3079F DIAST BP 80-89 MM HG: CPT | Performed by: OBSTETRICS & GYNECOLOGY

## 2022-08-19 PROCEDURE — 3008F BODY MASS INDEX DOCD: CPT | Performed by: OBSTETRICS & GYNECOLOGY

## 2022-08-19 RX ORDER — LACTOBACIL 2/BIFIDO 1/S.THERMO 450B CELL
1 PACKET (EA) ORAL DAILY
COMMUNITY
Start: 2022-06-29

## 2022-08-19 RX ORDER — ATENOLOL 50 MG/1
TABLET ORAL
COMMUNITY
Start: 2022-08-11

## 2022-08-19 RX ORDER — ESTRADIOL 1 MG/1
1 TABLET ORAL DAILY
Qty: 90 TABLET | Refills: 3 | Status: SHIPPED | OUTPATIENT
Start: 2022-08-19 | End: 2022-08-19

## 2022-08-19 RX ORDER — PROGESTERONE 200 MG/1
200 CAPSULE ORAL AS DIRECTED
Qty: 90 CAPSULE | Refills: 5 | Status: SHIPPED | OUTPATIENT
Start: 2022-08-19

## 2022-08-19 RX ORDER — ESTRADIOL 1 MG/1
1 TABLET ORAL DAILY
Qty: 90 TABLET | Refills: 5 | Status: SHIPPED | OUTPATIENT
Start: 2022-08-19

## 2022-09-02 LAB — LAST PAP RESULT: NORMAL

## 2023-02-17 ENCOUNTER — TELEPHONE (OUTPATIENT)
Facility: CLINIC | Age: 58
End: 2023-02-17

## 2023-02-17 NOTE — TELEPHONE ENCOUNTER
[facility] pharmacy is requesting prescription verification. It is currently written for:  Progesterone 200mg by mouth as directed.    Please provide direction on how often should patient take the medication

## 2023-02-20 NOTE — TELEPHONE ENCOUNTER
Verified with [facility] pharmacy:  Rx Progesterone 200mg by mouth, 1 tab daily per      Verbalized understanding.

## 2023-05-31 ENCOUNTER — TELEPHONE (OUTPATIENT)
Facility: LOCATION | Age: 58
End: 2023-05-31

## 2023-05-31 NOTE — TELEPHONE ENCOUNTER
S/w pt, updated her Dr Zaira Novoa placed order at her last exam.  We can have her f/u with Dr. Arnav Martin and pt states she will get the [facility] and then schedule appt

## 2023-06-02 ENCOUNTER — HOSPITAL ENCOUNTER (OUTPATIENT)
Dept: MAMMOGRAPHY | Facility: HOSPITAL | Age: 58
Discharge: HOME | End: 2023-06-02
Attending: OBSTETRICS & GYNECOLOGY
Payer: COMMERCIAL

## 2023-06-02 DIAGNOSIS — Z12.31 ENCOUNTER FOR SCREENING MAMMOGRAM FOR BREAST CANCER: ICD-10-CM

## 2023-06-02 PROCEDURE — 77063 BREAST TOMOSYNTHESIS BI: CPT | Performed by: OBSTETRICS & GYNECOLOGY

## 2023-06-02 PROCEDURE — 77067 SCR MAMMO BI INCL CAD: CPT | Performed by: OBSTETRICS & GYNECOLOGY

## 2023-09-12 NOTE — PROGRESS NOTES
Jesus Yung is a 46year old female  No LMP recorded. Patient has had an ablation. Patient presents with:  Wellness Visit  . Patient has been having issues with IBS since summer, getting better.  Hot flashes controlled with HRT    OBSTETRICS HIST 10/14/2014    Procedure: ESOPHAGOGASTRODUODENOSCOPY, COLONOSCOPY, POSSIBLE BIOPSY, POSSIBLE POLYPECTOMY 87556,00825;  Surgeon: An Hamilton MD;  Location: [address] [place] [address]       SOCIAL HISTORY:  Social History    Socioeconomic History      M Grandmother         Biliary colic       MEDICATIONS:    Current Outpatient Medications:   •  Progesterone Micronized 200 MG Oral Cap, Take 1 capsule (200 mg total) by mouth nightly., Disp: 90 capsule, Rfl: 4  •  estradiol (ESTRACE) 1 MG Oral Tab, Take 1 ta pain.  Skin/Breast:  Denies any breast pain, lumps, or discharge. Neurological:  denies headaches, extremity weakness or numbness. Psychiatric: denies depression or anxiety. Endocrine:   denies excessive thirst or urination.   Heme/Lymph:  denies histor Expiration Date (Optional): 08/2025

## 2023-12-01 DIAGNOSIS — N95.1 MENOPAUSAL SYMPTOMS: ICD-10-CM

## 2023-12-01 RX ORDER — ESTRADIOL 1 MG/1
1 TABLET ORAL DAILY
Qty: 90 TABLET | Refills: 5 | OUTPATIENT
Start: 2023-12-01

## 2023-12-19 DIAGNOSIS — N95.1 MENOPAUSAL SYMPTOMS: ICD-10-CM

## 2023-12-19 RX ORDER — ESTRADIOL 1 MG/1
1 TABLET ORAL DAILY
Qty: 90 TABLET | Refills: 0 | Status: SHIPPED | OUTPATIENT
Start: 2023-12-19

## 2023-12-19 NOTE — TELEPHONE ENCOUNTER
Pt called in rq refill on their Estradiol 1 mg Oral  Tabs until their upcoming appt on 2/8/2024 with KD.

## 2023-12-19 NOTE — TELEPHONE ENCOUNTER
Pt aware rx has been sent. Stressed the importance of taking progesterone daily at bedtime. Verbalized understanding.

## 2023-12-19 NOTE — TELEPHONE ENCOUNTER
Last [facility] 8/19/22. She did schedule her annual exam 2/8/24. She is requesting a refill on her Estradiol tabs to get her to her upcoming appointment. Order pended. Will call pt once rx has been sent.

## 2024-02-15 ENCOUNTER — APPOINTMENT (OUTPATIENT)
Dept: GENERAL RADIOLOGY | Facility: HOSPITAL | Age: 59
End: 2024-02-15
Attending: STUDENT IN AN ORGANIZED HEALTH CARE EDUCATION/TRAINING PROGRAM
Payer: COMMERCIAL

## 2024-02-15 ENCOUNTER — APPOINTMENT (OUTPATIENT)
Dept: ULTRASOUND IMAGING | Facility: HOSPITAL | Age: 59
End: 2024-02-15
Attending: STUDENT IN AN ORGANIZED HEALTH CARE EDUCATION/TRAINING PROGRAM
Payer: COMMERCIAL

## 2024-02-15 ENCOUNTER — HOSPITAL ENCOUNTER (EMERGENCY)
Facility: HOSPITAL | Age: 59
Discharge: HOME | End: 2024-02-15
Attending: STUDENT IN AN ORGANIZED HEALTH CARE EDUCATION/TRAINING PROGRAM
Payer: COMMERCIAL

## 2024-02-15 VITALS
OXYGEN SATURATION: 95 % | RESPIRATION RATE: 13 BRPM | HEART RATE: 67 BPM | TEMPERATURE: 98 F | SYSTOLIC BLOOD PRESSURE: 126 MMHG | WEIGHT: 165 LBS | HEIGHT: 63 IN | DIASTOLIC BLOOD PRESSURE: 92 MMHG | BODY MASS INDEX: 29.23 KG/M2

## 2024-02-15 DIAGNOSIS — R00.2 PALPITATIONS: Primary | ICD-10-CM

## 2024-02-15 DIAGNOSIS — R07.89 CHEST DISCOMFORT: ICD-10-CM

## 2024-02-15 DIAGNOSIS — G47.00 INSOMNIA, UNSPECIFIED TYPE: ICD-10-CM

## 2024-02-15 LAB
ALBUMIN SERPL-MCNC: 3.9 G/DL (ref 3.4–5)
ALBUMIN/GLOB SERPL: 1 {RATIO} (ref 1–2)
ALP LIVER SERPL-CCNC: 86 U/L
ALT SERPL-CCNC: 33 U/L
ANION GAP SERPL CALC-SCNC: 6 MMOL/L (ref 0–18)
APTT PPP: 22.7 SECONDS (ref 23.3–35.6)
AST SERPL-CCNC: 21 U/L (ref 15–37)
BASOPHILS # BLD AUTO: 0.05 X10(3) UL (ref 0–0.2)
BASOPHILS NFR BLD AUTO: 0.7 %
BILIRUB SERPL-MCNC: 0.4 MG/DL (ref 0.1–2)
BUN BLD-MCNC: 11 MG/DL (ref 9–23)
CALCIUM BLD-MCNC: 9.4 MG/DL (ref 8.5–10.1)
CHLORIDE SERPL-SCNC: 107 MMOL/L (ref 98–112)
CO2 SERPL-SCNC: 26 MMOL/L (ref 21–32)
CREAT BLD-MCNC: 0.84 MG/DL
D DIMER PPP FEU-MCNC: 0.41 UG/ML FEU (ref ?–0.59)
EGFRCR SERPLBLD CKD-EPI 2021: 80 ML/MIN/1.73M2 (ref 60–?)
EOSINOPHIL # BLD AUTO: 0.1 X10(3) UL (ref 0–0.7)
EOSINOPHIL NFR BLD AUTO: 1.3 %
ERYTHROCYTE [DISTWIDTH] IN BLOOD BY AUTOMATED COUNT: 12.5 %
GLOBULIN PLAS-MCNC: 3.9 G/DL (ref 2.8–4.4)
GLUCOSE BLD-MCNC: 104 MG/DL (ref 70–99)
HCT VFR BLD AUTO: 42.5 %
HGB BLD-MCNC: 14.7 G/DL
IMM GRANULOCYTES # BLD AUTO: 0.02 X10(3) UL (ref 0–1)
IMM GRANULOCYTES NFR BLD: 0.3 %
INR BLD: 0.89 (ref 0.8–1.2)
LYMPHOCYTES # BLD AUTO: 2.05 X10(3) UL (ref 1–4)
LYMPHOCYTES NFR BLD AUTO: 27.2 %
MAGNESIUM SERPL-MCNC: 1.9 MG/DL (ref 1.6–2.6)
MCH RBC QN AUTO: 31.7 PG (ref 26–34)
MCHC RBC AUTO-ENTMCNC: 34.6 G/DL (ref 31–37)
MCV RBC AUTO: 91.6 FL
MONOCYTES # BLD AUTO: 0.62 X10(3) UL (ref 0.1–1)
MONOCYTES NFR BLD AUTO: 8.2 %
NEUTROPHILS # BLD AUTO: 4.69 X10 (3) UL (ref 1.5–7.7)
NEUTROPHILS # BLD AUTO: 4.69 X10(3) UL (ref 1.5–7.7)
NEUTROPHILS NFR BLD AUTO: 62.3 %
NT-PROBNP SERPL-MCNC: 67 PG/ML (ref ?–125)
OSMOLALITY SERPL CALC.SUM OF ELEC: 288 MOSM/KG (ref 275–295)
PLATELET # BLD AUTO: 302 10(3)UL (ref 150–450)
POTASSIUM SERPL-SCNC: 4.2 MMOL/L (ref 3.5–5.1)
PROT SERPL-MCNC: 7.8 G/DL (ref 6.4–8.2)
PROTHROMBIN TIME: 12 SECONDS (ref 11.6–14.8)
RBC # BLD AUTO: 4.64 X10(6)UL
SODIUM SERPL-SCNC: 139 MMOL/L (ref 136–145)
TROPONIN I SERPL HS-MCNC: <3 NG/L
WBC # BLD AUTO: 7.5 X10(3) UL (ref 4–11)

## 2024-02-15 PROCEDURE — 83735 ASSAY OF MAGNESIUM: CPT | Performed by: STUDENT IN AN ORGANIZED HEALTH CARE EDUCATION/TRAINING PROGRAM

## 2024-02-15 PROCEDURE — 84484 ASSAY OF TROPONIN QUANT: CPT | Performed by: STUDENT IN AN ORGANIZED HEALTH CARE EDUCATION/TRAINING PROGRAM

## 2024-02-15 PROCEDURE — 99285 EMERGENCY DEPT VISIT HI MDM: CPT

## 2024-02-15 PROCEDURE — 93971 EXTREMITY STUDY: CPT | Performed by: STUDENT IN AN ORGANIZED HEALTH CARE EDUCATION/TRAINING PROGRAM

## 2024-02-15 PROCEDURE — 85610 PROTHROMBIN TIME: CPT | Performed by: STUDENT IN AN ORGANIZED HEALTH CARE EDUCATION/TRAINING PROGRAM

## 2024-02-15 PROCEDURE — 71045 X-RAY EXAM CHEST 1 VIEW: CPT | Performed by: STUDENT IN AN ORGANIZED HEALTH CARE EDUCATION/TRAINING PROGRAM

## 2024-02-15 PROCEDURE — 85730 THROMBOPLASTIN TIME PARTIAL: CPT | Performed by: STUDENT IN AN ORGANIZED HEALTH CARE EDUCATION/TRAINING PROGRAM

## 2024-02-15 PROCEDURE — 36415 COLL VENOUS BLD VENIPUNCTURE: CPT

## 2024-02-15 PROCEDURE — 85025 COMPLETE CBC W/AUTO DIFF WBC: CPT | Performed by: STUDENT IN AN ORGANIZED HEALTH CARE EDUCATION/TRAINING PROGRAM

## 2024-02-15 PROCEDURE — 80053 COMPREHEN METABOLIC PANEL: CPT | Performed by: STUDENT IN AN ORGANIZED HEALTH CARE EDUCATION/TRAINING PROGRAM

## 2024-02-15 PROCEDURE — 83880 ASSAY OF NATRIURETIC PEPTIDE: CPT | Performed by: STUDENT IN AN ORGANIZED HEALTH CARE EDUCATION/TRAINING PROGRAM

## 2024-02-15 PROCEDURE — 93005 ELECTROCARDIOGRAM TRACING: CPT

## 2024-02-15 PROCEDURE — 85379 FIBRIN DEGRADATION QUANT: CPT | Performed by: STUDENT IN AN ORGANIZED HEALTH CARE EDUCATION/TRAINING PROGRAM

## 2024-02-15 NOTE — ED PROVIDER NOTES
Patient Seen in: [facility] Emergency Department      History     Chief Complaint   Patient presents with    Arrythmia/Palpitations     Stated Complaint: heart palpitations since 0130 AM    Subjective:   HPI    Patient is a 58-year-old female presenting to the emergency department for evaluation of heart palpitations since 1:30 AM.  Patient reports having had similar symptoms on and off in the past more consistently in the last several days and constant since 130.  With this the patient has felt some throbbing sensation in her chest.  No difficulty breathing, no other complaints.  Patient has been under a great deal of stress and was initially attributing the symptoms likely to stress itself.  She has occasionally woken up at night with significant pain and spasm in her left calf.  This has not occurred in the last few days.  She has discussed this with her primary care physician and so far elected to avoid any medications.  The palpitations today have lasted longer than usual.  No obvious modifying factors.    Objective:   Past Medical History:   Diagnosis Date    Abnormal Pap smear of cervix 10/2010    ascus    Allergic rhinitis 4/3/2012    Anesthesia complication     ASCUS of cervix with negative high risk HPV 12/2012    Asthma     Atypical chest pain 4/29/2019    Benign paroxysmal vertigo of right ear 8/22/2017    BPPV (benign paroxysmal positional vertigo), right 5/9/2018    Cerumen impaction 12/15/2014    Collapsed lung 1991    Family history of early CAD 4/29/2019    GERD (gastroesophageal reflux disease) 4/3/2012    H/O cervical biopsy 11/2000    Mild intermittent reactive airway disease with acute exacerbation 1/8/2019    Pap smear for cervical cancer screening 12/2015, 12/14, 12/13, 12/13, 11/11, 11/10    negative hpv negative    Perirectal abscess     Multiple surgeries done    PONV (postoperative nausea and vomiting)     severe nausea w yesenia-rectal surgery    Sprain of foot joint, left, initial  encounter 2016    Subclinical hypothyroidism 2017    Wears glasses     PRN              Past Surgical History:   Procedure Laterality Date    BREAST LUMPECTOMY  2021          x1    COLONOSCOPY      COLONOSCOPY  2018    normal- repeat 10 yrs    COLONOSCOPY & POLYPECTOMY  10/14    polyp- repeat 5 yrs (adenoma)    COLONOSCOPY,BIOPSY N/A 10/14/2014    Procedure: ESOPHAGOGASTRODUODENOSCOPY, COLONOSCOPY, POSSIBLE BIOPSY, POSSIBLE POLYPECTOMY 93523,66713;  Surgeon: Ar Barron MD;  Location: [facility]    EGD N/A 2018    Procedure: ESOPHAGOGASTRODUODENOSCOPY, COLONOSCOPY, POSSIBLE BIOPSY, POSSIBLE POLYPECTOMY 80861, 10208;  Surgeon: Ar Barron MD;  Location: [facility]    HYSTEROSCOPY,ABLATION ENDOMETRIUM  2016    Hysteroscopy, D&C, NovaSure ablation - Dr. Paula Orozco.     CHALINO BIOPSY STEREO NODULE 1 SITE RIGHT (CPT=19081)  2021    radial scar    CHALINO LOCALIZATION WIRE 1 SITE RIGHT (CPT=19281)  2021    radial scar    OTHER SURGICAL HISTORY      Perirectal abscess - multiple procedures    TONSILLECTOMY      UPPER GI ENDO POSS BARRTTS  10/14        UPPER GI ENDOSCOPY,BIOPSY N/A 10/14/2014    Procedure: ESOPHAGOGASTRODUODENOSCOPY, COLONOSCOPY, POSSIBLE BIOPSY, POSSIBLE POLYPECTOMY 13899,92528;  Surgeon: Ar Barron MD;  Location: [facility]                Social History     Socioeconomic History    Marital status:    Tobacco Use    Smoking status: Former     Packs/day: 1.00     Years: 26.00     Additional pack years: 0.00     Total pack years: 26.00     Types: Cigarettes     Start date: 1/15/1978     Quit date: 2002     Years since quittin.1    Smokeless tobacco: Never   Vaping Use    Vaping Use: Never used   Substance and Sexual Activity    Alcohol use: Yes     Alcohol/week: 2.0 standard drinks of alcohol     Types: 2 Glasses of wine per week     Comment: wine q day    Drug use: No    Sexual  activity: Yes     Birth control/protection: Vasectomy     Comment: current   Other Topics Concern    Caffeine Concern No     Comment: coffee    Exercise Yes     Comment: 5 d/wk    Seat Belt Yes              Review of Systems    Positive for stated complaint: heart palpitations since 0130 AM  Other systems are as noted in HPI.  Constitutional and vital signs reviewed.      All other systems reviewed and negative except as noted above.    Physical Exam     ED Triage Vitals [02/15/24 0928]   BP (!) 147/98   Pulse 109   Resp 13   Temp 98.1 °F (36.7 °C)   Temp src Oral   SpO2 97 %   O2 Device None (Room air)       Current:BP (S) (!) 126/92   Pulse 67   Temp 98.1 °F (36.7 °C) (Oral)   Resp 13   Ht 160 cm (5' 3\")   Wt 74.8 kg   SpO2 95%   BMI 29.23 kg/m²         Physical Exam    Constitutional: No apparent distress  Eyes: No scleral icterus  Heart: regular rate rhythm, no murmurs  Lungs: Clear to auscultation bilaterally  Abdomen: Soft and nontender  Skin: No rash  Neuro: Alert and oriented ×3, moves all extremities, normal gait  Extremities: No edema, no calf tenderness          ED Course/ My interpretations:     Labs Reviewed   COMP METABOLIC PANEL (14) - Abnormal; Notable for the following components:       Result Value    Glucose 104 (*)     All other components within normal limits   PTT, ACTIVATED - Abnormal; Notable for the following components:    PTT 22.7 (*)     All other components within normal limits   TROPONIN I HIGH SENSITIVITY - Normal   PROTHROMBIN TIME (PT) - Normal   PRO BETA NATRIURETIC PEPTIDE - Normal   D-DIMER - Normal   MAGNESIUM - Normal   CBC WITH DIFFERENTIAL WITH PLATELET    Narrative:     The following orders were created for panel order CBC With Differential With Platelet.  Procedure                               Abnormality         Status                     ---------                               -----------         ------                     CBC W/ DIFFERENTIAL[[id number]]                               Final result                 Please view results for these tests on the individual orders.   RAINBOW DRAW LAVENDER   RAINBOW DRAW LIGHT GREEN   RAINBOW DRAW BLUE   RAINBOW DRAW GOLD   CBC W/ DIFFERENTIAL     EKG    Rate, intervals and axes as noted on EKG Report.  Rate: 102  Rhythm: Sinus Rhythm  Reading: Sinus tachycardia, normal intervals, normal axis, no ST elevations.  No previous EKG available for comparison.             My independent imaging interpretation:      Procedures    Comp Metabolic Panel (14)    CBC With Differential With Platelet    Troponin I (High Sensitivity)    Prothrombin Time (PT)    PTT, Activated    Pro Beta Natriuretic Peptide    D-Dimer    Magnesium    XR CHEST AP PORTABLE  (CPT=71045) no pneumothorax    US VENOUS DOPPLER LEG LEFT - DIAG IMG (CPT=93971)      All available radiology reports for this visit reviewed.      Medications given:  Orders Placed This Encounter    Comp Metabolic Panel (14)    CBC With Differential With Platelet    Troponin I (High Sensitivity)    Prothrombin Time (PT)    PTT, Activated    Pro Beta Natriuretic Peptide    D-Dimer    Magnesium          MDM      Extensive differential diagnosis was considered including underlying musculoskeletal pain, acute MI, pericarditis, pneumonia, pneumothorax, cardiac, pulmonary, thromboembolic, gastrointestinal, vascular, infectious and other etiologies.    EKG was found to be nonischemic, troponin and D-dimer were unremarkable.  Given the duration of patient's symptoms ruling out acute coronary syndrome or PE.  Left lower extremity Doppler showed no DVT.  There is no sign of infectious process identified on history, physical exam or workup.  Patient remained hemodynamically stable throughout their emergency department period of observation with no adverse events or symptoms reported by the patient.    Given patient's history, physical exam findings as well as work-up in the ER his clinical picture shows no signs  of life-threatening pathology.  Patient can safely follow-up on outpatient basis.  Patient advised on the importance of follow-up and reasons to return to the ER if they were to worsen.  They demonstrated understanding, they are comfortable with the plan and will follow-up as directed.    We discussed the recommendation to follow-up with cardiology for possible Holter monitor if symptoms were to continue.  Patient will also recheck with her primary care physician.    Disposition and Plan     Clinical Impression:  1. Palpitations    2. Chest discomfort    3. Insomnia, unspecified type         Disposition:  Discharge  2/15/2024 12:21 pm    Follow-up:  Livan Mcgregor MD  [address] [place] [address] [phone]    Schedule an appointment as soon as possible for a visit  For recheck    Rik Turner MD  [address] [address] [phone]    Schedule an appointment as soon as possible for a visit  For recheck          Medications Prescribed:  Current Discharge Medication List                                 Documentation created with the aid of Dragon voice recognition software.  Although efforts were made to ensure the accuracy of the note, some inaccuracies may persist.

## 2024-02-15 NOTE — DISCHARGE INSTRUCTIONS
The following supplements can be very helpful in improving sleep and diminishing anxiety without significant sedation that would impair your ability to care for your daughter.      Magnesium glycinate 100 mg start with 1 capsule in the morning and 2 capsules 30 minutes prior to bedtime.  You may increase the bedtime dose to 600 mg as needed for anxiety and to improve sleep.    Passion flower extract.  Take 30 minutes prior to bed as directed on the bottle.      L-theanine supplement 100 mg capsules.  Start with 1 capsule as needed twice per day.  Improves anxiety and sleep.  May increase to 3 capsules twice a day.    Melatonin 1 to 3 mg orally at bedtime.

## 2024-02-15 NOTE — ED INITIAL ASSESSMENT (HPI)
C/o chest pain since 01:30; has had intermittently in the past; this time it isn't subsiding; pt denies anything that improves it of instigates the pain other then stress; pt usually takes HTN medication but hasn't taken her meds yet this morning

## 2024-02-16 LAB
ATRIAL RATE: 102 BPM
P AXIS: 42 DEGREES
P-R INTERVAL: 152 MS
Q-T INTERVAL: 346 MS
QRS DURATION: 76 MS
QTC CALCULATION (BEZET): 450 MS
R AXIS: 5 DEGREES
T AXIS: 18 DEGREES
VENTRICULAR RATE: 102 BPM

## 2024-03-21 ENCOUNTER — TELEPHONE (OUTPATIENT)
Facility: CLINIC | Age: 59
End: 2024-03-21

## 2024-03-21 DIAGNOSIS — N95.1 MENOPAUSAL SYMPTOMS: ICD-10-CM

## 2024-03-21 RX ORDER — ESTRADIOL 1 MG/1
1 TABLET ORAL DAILY
Qty: 90 TABLET | Refills: 0 | Status: SHIPPED | OUTPATIENT
Start: 2024-03-21

## 2024-03-21 RX ORDER — ESTRADIOL 1 MG/1
1 TABLET ORAL DAILY
Qty: 30 TABLET | Refills: 0 | Status: SHIPPED | OUTPATIENT
Start: 2024-03-21 | End: 2024-03-21

## 2024-03-21 RX ORDER — ESTRADIOL 1 MG/1
1 TABLET ORAL DAILY
Qty: 90 TABLET | Refills: 0 | OUTPATIENT
Start: 2024-03-21

## 2024-03-21 NOTE — TELEPHONE ENCOUNTER
Pt calling regarding estrogen prescription prescribed by [facility], states she does not have enough to last her until WWE with [facility] in April. Pt states she had a sooner appt but had a family emergency so she had to cancel, would like to know if she is able to get enough to hold her over until appt.

## 2024-04-11 ENCOUNTER — OFFICE VISIT (OUTPATIENT)
Facility: CLINIC | Age: 59
End: 2024-04-11
Payer: COMMERCIAL

## 2024-04-11 VITALS
HEIGHT: 63 IN | DIASTOLIC BLOOD PRESSURE: 70 MMHG | HEART RATE: 78 BPM | WEIGHT: 185 LBS | SYSTOLIC BLOOD PRESSURE: 128 MMHG | BODY MASS INDEX: 32.78 KG/M2

## 2024-04-11 DIAGNOSIS — N95.1 MENOPAUSAL SYMPTOMS: ICD-10-CM

## 2024-04-11 DIAGNOSIS — Z12.31 ENCOUNTER FOR SCREENING MAMMOGRAM FOR BREAST CANCER: Primary | ICD-10-CM

## 2024-04-11 PROCEDURE — 3078F DIAST BP <80 MM HG: CPT | Performed by: OBSTETRICS & GYNECOLOGY

## 2024-04-11 PROCEDURE — 99396 PREV VISIT EST AGE 40-64: CPT | Performed by: OBSTETRICS & GYNECOLOGY

## 2024-04-11 PROCEDURE — 3074F SYST BP LT 130 MM HG: CPT | Performed by: OBSTETRICS & GYNECOLOGY

## 2024-04-11 PROCEDURE — 3008F BODY MASS INDEX DOCD: CPT | Performed by: OBSTETRICS & GYNECOLOGY

## 2024-04-11 RX ORDER — PROGESTERONE 200 MG/1
200 CAPSULE ORAL AS DIRECTED
Qty: 90 CAPSULE | Refills: 5 | Status: SHIPPED | OUTPATIENT
Start: 2024-04-11

## 2024-04-11 RX ORDER — ESTRADIOL 1 MG/1
1 TABLET ORAL DAILY
Qty: 90 TABLET | Refills: 5 | Status: SHIPPED | OUTPATIENT
Start: 2024-04-11

## 2024-04-11 NOTE — PROGRESS NOTES
Priscilla Khan is a 58 year old female  No LMP recorded. Patient has had an ablation.   Chief Complaint   Patient presents with    Wellness Visit    Gyn Problem     urinary incontinence worse with coughing and sneezing    .   Her hot flashes and night sweats are improved on the hormone replacement.  Sometimes she is not real good about taking the progesterone she has not had any vaginal bleeding.  Believe it was November she urinated and noticed some blood in her urine went to an urgent care they did a culture which was negative.  She has not had any bleeding since then she was sure it was in her urine.  She saw a urologist which did a CAT scan.  Blood work is pending.  She has had multiple colonoscopies.  She has asthma and coughs.  She does have some loss of urine with coughing.  She does not want to proceed [facility] physical therapy at this time.    OBSTETRICS HISTORY:  OB History    Para Term  AB Living   2 1 1   1     SAB IAB Ectopic Multiple Live Births                  # Outcome Date GA Lbr Jorge/2nd Weight Sex Type Anes PTL Lv   2 Term 04/10/04    F CS-Unspec      1 AB                GYNE HISTORY:  Periods none due to menopause    History   Sexual Activity    Sexual activity: Yes    Birth control/ protection: Vasectomy     Comment: current        Pap Date: 22  Pap Result Notes: Negative        MEDICAL HISTORY:  Past Medical History:    Abnormal Pap smear of cervix    ascus    Allergic rhinitis    Anesthesia complication    ASCUS of cervix with negative high risk HPV    Asthma (HCC)    Atypical chest pain    Benign paroxysmal vertigo of right ear    BPPV (benign paroxysmal positional vertigo), right    Cerumen impaction    Collapsed lung    Family history of early CAD    GERD (gastroesophageal reflux disease)    H/O cervical biopsy    Mild intermittent reactive airway disease with acute exacerbation (HCC)    Pap smear for cervical cancer screening    negative hpv negative    Perirectal  abscess    Multiple surgeries done    PONV (postoperative nausea and vomiting)    severe nausea w yesenia-rectal surgery    Sprain of foot joint, left, initial encounter    Subclinical hypothyroidism    Wears glasses    PRN       SURGICAL HISTORY:  Past Surgical History:   Procedure Laterality Date    Breast lumpectomy  2021          x1    Colonoscopy      Colonoscopy  2018    normal- repeat 10 yrs    Colonoscopy & polypectomy  10/14    polyp- repeat 5 yrs (adenoma)    Colonoscopy,biopsy N/A 10/14/2014    Procedure: ESOPHAGOGASTRODUODENOSCOPY, COLONOSCOPY, POSSIBLE BIOPSY, POSSIBLE POLYPECTOMY 99943,09476;  Surgeon: Ar Barron MD;  Location: [facility]    Egd N/A 2018    Procedure: ESOPHAGOGASTRODUODENOSCOPY, COLONOSCOPY, POSSIBLE BIOPSY, POSSIBLE POLYPECTOMY 33264, 97817;  Surgeon: Ar Barorn MD;  Location: [facility]    Hysteroscopy,ablation endometrium  2016    Hysteroscopy, D&C, NovaSure ablation - Dr. Paula Orozco.     Juan biopsy stereo nodule 1 site right (cpt=19081)  2021    radial scar    Juan localization wire 1 site right (cpt=19281)  2021    radial scar    Other surgical history      Perirectal abscess - multiple procedures    Tonsillectomy      Upper gi endo poss barrtts  10/14        Upper gi endoscopy,biopsy N/A 10/14/2014    Procedure: ESOPHAGOGASTRODUODENOSCOPY, COLONOSCOPY, POSSIBLE BIOPSY, POSSIBLE POLYPECTOMY 24683,16702;  Surgeon: Ar Barron MD;  Location: [facility]       SOCIAL HISTORY:  Social History     Socioeconomic History    Marital status:      Spouse name: Not on file    Number of children: Not on file    Years of education: Not on file    Highest education level: Not on file   Occupational History    Not on file   Tobacco Use    Smoking status: Former     Current packs/day: 0.00     Average packs/day: 1 pack/day for 26.0 years (26.0 ttl pk-yrs)     Types: Cigarettes     Start  date: 1/15/1978     Quit date: 2002     Years since quittin.2    Smokeless tobacco: Never   Vaping Use    Vaping status: Never Used   Substance and Sexual Activity    Alcohol use: Yes     Alcohol/week: 2.0 standard drinks of alcohol     Types: 2 Glasses of wine per week     Comment: wine q day    Drug use: No    Sexual activity: Yes     Birth control/protection: Vasectomy     Comment: current   Other Topics Concern     Service Not Asked    Blood Transfusions Not Asked    Caffeine Concern No     Comment: coffee    Occupational Exposure Not Asked    Hobby Hazards Not Asked    Sleep Concern Not Asked    Stress Concern Not Asked    Weight Concern Not Asked    Special Diet Not Asked    Back Care Not Asked    Exercise Yes     Comment: 5 d/wk    Bike Helmet Not Asked    Seat Belt Yes    Self-Exams Not Asked   Social History Narrative    Not on file     Social Determinants of Health     Financial Resource Strain: Not on file   Food Insecurity: Not on file   Transportation Needs: Not on file   Physical Activity: Not on file   Stress: Not on file   Social Connections: Not on file   Housing Stability: Not on file       FAMILY HISTORY:  Family History   Problem Relation Age of Onset    Heart Disorder Brother         Premature CAD - 28    Other (GERD) Father     No Known Problems Mother     Other (GERD) Brother     Other (Other) Maternal Grandmother         Biliary colic    No Known Problems Daughter        MEDICATIONS:    Current Outpatient Medications:     estradiol 1 MG Oral Tab, Take 1 tablet (1 mg total) by mouth daily., Disp: 90 tablet, Rfl: 0    atenolol 50 MG Oral Tab, , Disp: , Rfl:     Probiotic Product (VSL#3) Oral Cap, Take 1 capsule by mouth daily., Disp: , Rfl:     progesterone 200 MG Oral Cap, Take 1 capsule (200 mg total) by mouth As Directed., Disp: 90 capsule, Rfl: 5    Dapsone (ACZONE) 5 % External Gel, Apply to face twice daily, Disp: 90 g, Rfl: 3    OMEPRAZOLE 40 MG Oral Capsule Delayed  Release, TAKE ONE CAPSULE BY MOUTH TWICE DAILY BEFORE A MEAL, Disp: 180 capsule, Rfl: 0    LEVOTHYROXINE 25 MCG Oral Tab, TAKE 1 TABLET(25 MCG) BY MOUTH BEFORE BREAKFAST, Disp: 90 tablet, Rfl: 0    Probiotic Product (VSL#3 DS) Oral Powd Pack, Take 0.5 Packages by mouth 2 (two) times daily. (Patient taking differently: Take 0.5 Packages by mouth daily.), Disp: 30 each, Rfl: 5    Progesterone Micronized 200 MG Oral Cap, TAKE 1 CAPSULE BY MOUTH EVERY NIGHT., Disp: 90 capsule, Rfl: 3    Montelukast Sodium 10 MG Oral Tab, Take 1 tablet (10 mg total) by mouth nightly., Disp: 90 tablet, Rfl: 3    Calcium Carbonate-Vitamin D (CALCIUM + D OR), Take by mouth daily., Disp: , Rfl:     MULTIVITAMINS OR TABS, 1 TABLET DAILY, Disp: , Rfl:     ALLERGIES:    Allergies   Allergen Reactions    Ciprofloxacin DIZZINESS    Codeine Phosphate NAUSEA AND VOMITING     Nausea vomiting--years ago         Review of Systems:  Constitutional:  Denies fatigue, night sweats, hot flashes  Gastrointestinal:  denies heartburn, abdominal pain, diarrhea or constipation  Genitourinary:  denies dysuria, incontinence, abnormal vaginal discharge, vaginal itching  Skin/Breast:  Denies any breast pain, lumps, or discharge.   Neurological:  denies headaches, extremity weakness or numbness.      PHYSICAL EXAM:   Constitutional: well developed, well nourished  Head/Face: normocephalic  Neck/Thyroid: thyroid symmetric, no thyromegaly, no nodules, no adenopathy  Breast: normal without palpable masses, tenderness, asymmetry, nipple discharge, nipple retraction or skin changes  Abdomen:  soft, nontender, nondistended, no masses  Skin/Hair: no unusual rashes or bruises   Extremities: no edema, no cyanosis  Psychiatric:  Oriented to time, place, person and situation. Appropriate mood and affect    Pelvic Exam:  External Genitalia: normal appearance, hair distribution, and no lesions  Urethral Meatus:  normal in size, location, without lesions and prolapse  Bladder:   No fullness, masses or tenderness  Vagina:  Normal appearance without lesions, no abnormal discharge  Cervix:  Normal without tenderness on motion  Uterus: normal in size, contour, position, mobility, without tenderness  Adnexa: normal without masses or tenderness  Perineum: normal  Anus: no hemorroids     Assessment & Plan:  There are no diagnoses linked to this encounter.    Well woman exam.  Renew HRT.      Priscilla Khan is a 58 year old female  No LMP recorded. Patient has had an ablation.   Chief Complaint   Patient presents with    Wellness Visit    Gyn Problem     urinary incontinence worse with coughing and sneezing    .         OBSTETRICS HISTORY:  OB History    Para Term  AB Living   2 1 1   1     SAB IAB Ectopic Multiple Live Births                  # Outcome Date GA Lbr Jorge/2nd Weight Sex Type Anes PTL Lv   2 Term 04/10/04    F CS-Unspec      1 AB                GYNE HISTORY:  Periods none due to menopause    History   Sexual Activity    Sexual activity: Yes    Birth control/ protection: Vasectomy     Comment: current        Pap Date: 22  Pap Result Notes: Negative        MEDICAL HISTORY:  Past Medical History:    Abnormal Pap smear of cervix    ascus    Allergic rhinitis    Anesthesia complication    ASCUS of cervix with negative high risk HPV    Asthma (HCC)    Atypical chest pain    Benign paroxysmal vertigo of right ear    BPPV (benign paroxysmal positional vertigo), right    Cerumen impaction    Collapsed lung    Family history of early CAD    GERD (gastroesophageal reflux disease)    H/O cervical biopsy    Mild intermittent reactive airway disease with acute exacerbation (HCC)    Pap smear for cervical cancer screening    negative hpv negative    Perirectal abscess    Multiple surgeries done    PONV (postoperative nausea and vomiting)    severe nausea w yesenia-rectal surgery    Sprain of foot joint, left, initial encounter    Subclinical hypothyroidism    Wears glasses     PRN       SURGICAL HISTORY:  Past Surgical History:   Procedure Laterality Date    Breast lumpectomy  2021          x1    Colonoscopy      Colonoscopy  2018    normal- repeat 10 yrs    Colonoscopy & polypectomy  10/14    polyp- repeat 5 yrs (adenoma)    Colonoscopy,biopsy N/A 10/14/2014    Procedure: ESOPHAGOGASTRODUODENOSCOPY, COLONOSCOPY, POSSIBLE BIOPSY, POSSIBLE POLYPECTOMY 57375,54385;  Surgeon: Ar Barron MD;  Location: [facility]    Egd N/A 2018    Procedure: ESOPHAGOGASTRODUODENOSCOPY, COLONOSCOPY, POSSIBLE BIOPSY, POSSIBLE POLYPECTOMY 46498, 24138;  Surgeon: Ar Barron MD;  Location: [facility]    Hysteroscopy,ablation endometrium  2016    Hysteroscopy, D&C, NovaSure ablation - Dr. Paula Orozco.     Juan biopsy stereo nodule 1 site right (cpt=19081)  2021    radial scar    Juan localization wire 1 site right (cpt=19281)  2021    radial scar    Other surgical history      Perirectal abscess - multiple procedures    Tonsillectomy      Upper gi endo poss barrtts  10/14        Upper gi endoscopy,biopsy N/A 10/14/2014    Procedure: ESOPHAGOGASTRODUODENOSCOPY, COLONOSCOPY, POSSIBLE BIOPSY, POSSIBLE POLYPECTOMY 04346,73456;  Surgeon: Ar Barron MD;  Location: [facility]       SOCIAL HISTORY:  Social History     Socioeconomic History    Marital status:      Spouse name: Not on file    Number of children: Not on file    Years of education: Not on file    Highest education level: Not on file   Occupational History    Not on file   Tobacco Use    Smoking status: Former     Current packs/day: 0.00     Average packs/day: 1 pack/day for 26.0 years (26.0 ttl pk-yrs)     Types: Cigarettes     Start date: 1/15/1978     Quit date: 2002     Years since quittin.2    Smokeless tobacco: Never   Vaping Use    Vaping status: Never Used   Substance and Sexual Activity    Alcohol use: Yes     Alcohol/week: 2.0  standard drinks of alcohol     Types: 2 Glasses of wine per week     Comment: wine q day    Drug use: No    Sexual activity: Yes     Birth control/protection: Vasectomy     Comment: current   Other Topics Concern     Service Not Asked    Blood Transfusions Not Asked    Caffeine Concern No     Comment: coffee    Occupational Exposure Not Asked    Hobby Hazards Not Asked    Sleep Concern Not Asked    Stress Concern Not Asked    Weight Concern Not Asked    Special Diet Not Asked    Back Care Not Asked    Exercise Yes     Comment: 5 d/wk    Bike Helmet Not Asked    Seat Belt Yes    Self-Exams Not Asked   Social History Narrative    Not on file     Social Determinants of Health     Financial Resource Strain: Not on file   Food Insecurity: Not on file   Transportation Needs: Not on file   Physical Activity: Not on file   Stress: Not on file   Social Connections: Not on file   Housing Stability: Not on file       FAMILY HISTORY:  Family History   Problem Relation Age of Onset    Heart Disorder Brother         Premature CAD - 28    Other (GERD) Father     No Known Problems Mother     Other (GERD) Brother     Other (Other) Maternal Grandmother         Biliary colic    No Known Problems Daughter        MEDICATIONS:    Current Outpatient Medications:     estradiol 1 MG Oral Tab, Take 1 tablet (1 mg total) by mouth daily., Disp: 90 tablet, Rfl: 0    atenolol 50 MG Oral Tab, , Disp: , Rfl:     Probiotic Product (VSL#3) Oral Cap, Take 1 capsule by mouth daily., Disp: , Rfl:     progesterone 200 MG Oral Cap, Take 1 capsule (200 mg total) by mouth As Directed., Disp: 90 capsule, Rfl: 5    Dapsone (ACZONE) 5 % External Gel, Apply to face twice daily, Disp: 90 g, Rfl: 3    OMEPRAZOLE 40 MG Oral Capsule Delayed Release, TAKE ONE CAPSULE BY MOUTH TWICE DAILY BEFORE A MEAL, Disp: 180 capsule, Rfl: 0    LEVOTHYROXINE 25 MCG Oral Tab, TAKE 1 TABLET(25 MCG) BY MOUTH BEFORE BREAKFAST, Disp: 90 tablet, Rfl: 0    Probiotic Product  (VSL#3 DS) Oral Powd Pack, Take 0.5 Packages by mouth 2 (two) times daily. (Patient taking differently: Take 0.5 Packages by mouth daily.), Disp: 30 each, Rfl: 5    Progesterone Micronized 200 MG Oral Cap, TAKE 1 CAPSULE BY MOUTH EVERY NIGHT., Disp: 90 capsule, Rfl: 3    Montelukast Sodium 10 MG Oral Tab, Take 1 tablet (10 mg total) by mouth nightly., Disp: 90 tablet, Rfl: 3    Calcium Carbonate-Vitamin D (CALCIUM + D OR), Take by mouth daily., Disp: , Rfl:     MULTIVITAMINS OR TABS, 1 TABLET DAILY, Disp: , Rfl:     ALLERGIES:    Allergies   Allergen Reactions    Ciprofloxacin DIZZINESS    Codeine Phosphate NAUSEA AND VOMITING     Nausea vomiting--years ago         Review of Systems:  Constitutional:  Denies fatigue, night sweats, hot flashes  Gastrointestinal:  denies heartburn, abdominal pain, diarrhea or constipation  Genitourinary:  denies dysuria, incontinence, abnormal vaginal discharge, vaginal itching  Skin/Breast:  Denies any breast pain, lumps, or discharge.   Neurological:  denies headaches, extremity weakness or numbness.      PHYSICAL EXAM:   Constitutional: well developed, well nourished  Head/Face: normocephalic  Neck/Thyroid: thyroid symmetric, no thyromegaly, no nodules, no adenopathy  Breast: normal without palpable masses, tenderness, asymmetry, nipple discharge, nipple retraction or skin changes  Abdomen:  soft, nontender, nondistended, no masses  Skin/Hair: no unusual rashes or bruises   Extremities: no edema, no cyanosis  Psychiatric:  Oriented to time, place, person and situation. Appropriate mood and affect    Pelvic Exam:  External Genitalia: normal appearance, hair distribution, and no lesions  Urethral Meatus:  normal in size, location, without lesions and prolapse  Bladder:  No fullness, masses or tenderness  Vagina:  Normal appearance without lesions, no abnormal discharge  Cervix:  Normal without tenderness on motion  Uterus: normal in size, contour, position, mobility, without  tenderness  Adnexa: normal without masses or tenderness  Perineum: normal  Anus: no hemorroids     Assessment & Plan:  There are no diagnoses linked to this encounter.    Well woman exam.  Renew HRT.  Minimal stress urinary incontinence.  Asthma.  Mammogram.

## 2024-04-12 ENCOUNTER — TELEPHONE (OUTPATIENT)
Facility: CLINIC | Age: 59
End: 2024-04-12

## 2024-08-06 NOTE — TELEPHONE ENCOUNTER
RE: Plan of Care    Jeronimo Fournier MD    Thank you for referring Blaire Del Rosario. The following information reflects my assessment and plan of care.    Please review and sign the attached form to indicate your approval of the plan of care. Insurance compliance requires your approval be on this plan of care. After your review, please fax back all pages received. Should you have any questions, feel free to contact me.    Mis Jaquez OT  [facility] [address]  [address]  [address]  [facility] [address]  Phone: [phone]  Fax: [phone]           Plan of Care 24   Effective from: 2024  Effective to: 9/3/2024    Plan ID: [id number]            Participants as of Finalize on 2024    Name Type Comments Contact Info    Jeronimo Fournier MD Referring Provider  [phone]    Mis Jaquez OT Occupational Therapist  [phone]           Blaire Del Rosario MRN:[id number] (:1966 58 year old F)             Evaluation     Author: Mis Jaquez OT Status: Signed Last edited: 2024  2:30 PM       Occupational Therapy Evaluation    Visit Type: Initial Evaluation  Visit: 1  Referring Provider: Jeronimo Fournier MD  Medical Diagnosis (from order): M25.631 - Wrist stiffness, right   Treatment Diagnosis: right hand, right wrist - increased pain/symptoms, impaired range of motion and impaired strength.  Onset  - Date of onset: 2024  Chart reviewed at time of initial evaluation (relevant co-morbidities, allergies, tests and medications listed):  - Diagnostic tests reviewed: X-Ray  Co-morbidities: distal biceps repair by Dr. Ricci on 24, arthritis, weakness of UE, numbness and tingling in right UE; no allergies; refer to epic for meds.      SUBJECTIVE                                                                                                               Description of onset:  Patient reports that she underwent a right distal Biceps repair on 24 by  Pharmacy calling to verify directions for Progesterone 200mg. Per Dr. Carmen Ross, directions are to take one tablet daily at bedtime. Pharmacist verbalized understanding. Dr. Ricci and since then, she has had increased swelling in right hand along with stiffness and pain. She has since seen a hand surgeon, Dr. Jeronimo Fournier who has diagnosed her with Dupuytren's in her right palm. These areas of nodules and cording are what is painful for her. She is not able to make a full fist at this time and also has a very stiff and painful wrist.    Pain / Symptoms  - Pain rating (out of 10): Current: 4 ; Best: 4; Worst: 8  - Location: Right palm at A 1 pulley region where nodules are and where there is a thin cord going to the the thumb and the wrist  - Quality / Description: burning, tight  - Alleviating Factors: unable to state anything that helps reduce the pain    Function:   Limitations / Exacerbation Factors:   - Patient reports pain and difficulty with function reported below.  - meal/food prep, grooming/hygiene/self-care activities, sleep disturbed, upper body dressing, lower body dressing, grocery shopping, house/yard work  Prior Level of Function: pain free ADLs and IADLs. no limitation in involved extremity,  Personal Occupations Profile Affected: bathing/showering, upper body dressing, lower body dressing, personal hygiene/grooming, toileting/toilet hygiene, home establishment/managements, meal preparation/cleanup, sleep participation, shopping    Patient Goals: . \"Just to be able to flex my wrist and be able to a tight fist and to have  strength to perform my personal cares.    Prior treatment  - no therapies  - Discharged from hospital, home health, or skilled nursing facility in last 30 days: no  Home Environment   - Patient lives with: significant other  - Assistance available: as needed  - Denies 2 or more falls or an unexplained fall with injury in the last year.  - Feel safe at home / work / school: yes     OBJECTIVE                                                                                                                    Hand Dominance:  right-handed    Observation   Patient has a thickened most likely Dupuytren's nodule (Patient said Dr. Fournier confirmed this diagnosis) at the right middle, ring and small fingers, and a thin cording started at volar index finger A 1 pulley doing into the thenars.    Range of Motion (ROM)   (degrees unless noted; active unless noted; norms in ( ); negative=lacking to 0, positive=beyond 0)  Wrist:   - Flexion (60-80):       Left: 68        Right: 46 pain    - Extension (60-70):       Left: 76       Right: 41 pain    - Radial Deviation (20):       Left: 28       Right: 19 pain    - Ulnar Deviation (30):       Left: 36       Right: 24 pain    Strength  (out of 5 unless noted, standard test position unless noted)   : (lbs)    - Neutral:         Left: Trial(s): 54, 46, 49, Average: 49.67         Right: Trial(s): 5, 9, 9, Average: 7.67, pain     norms: 55-59 years, male: left 59.8-106.6, right 74.4-127.8; female: left 35.4-59.2, right 44.8-69.8  Pinch: (lbs)    - Lateral:          Left: Trial(s): 14, 14, 14, Average: 14         Right: Trial(s): 6, 5, 6, Average: 5.67    Lateral pinch norms: 55-59 years, male: left 18.3-27.7, right 20-28.4; female: left 12.5-16.9, right 13.2-18.2  Comments / Details: Left hand 1x using base of hand vs in the middle of the hand as standardized: 34#        Circumference  Wrist / Hand   - Metacarpophalangeal Row:  Left: 19.3 cm  Right: 19.3 cm             Range of Motion (ROM) (hand specific)  (degrees unless noted; active unless noted; norms in ( ); negative=lacking to 0, positive=beyond 0)  Index Finger:    - MCP Flexion (90):         Left: 84         Right: 48 pain    - PIP Joint Flexion (100):         Left:  88         Right: 88    - DIP Flexion (70-90):         Left: 71         Right: 43  Middle Finger:    - MCP Flexion (90):         Left: 88         Right: 58 pain    - PIP Joint Flexion (100):         Left: 97         Right: 89    - DIP Flexion (70-90):         Left: 86          Right: 46  Ring Finger:    - MCP Flexion (90):         Left: 86         Right: WNL 45    - PIP Joint Flexion (100):         Left: 95         Right: 89    - DIP Flexion (70-90):         Left: 82         Right: 65  Small Finger:    - MCP Flexion (90):         Left: 72         Right: 31 pain    - PIP Joint Flexion (100):         Left: 89         Right: 86    - DIP Flexion (70-90):         Left: 88         Right: 67  Hand Functional Range of Motion:     - Thumb Tip Opposition:  Left: 0 cm  Right: -1.3 cm    - Index Finger Distance to Distal Palmar Crease:  Left: 0 cm   Right: -2.7 cm    - Middle Finger Distance to Distal Palmar Crease:  Left: 0 cm  Right: -3 cm    - Ring Finger Distance to Distal Palmar Crease:  Left: 0 cm  Right: -2 cm    - Small Finger Distance to Distal Palmar Crease:  Left 0 cm  Right: -1 cm      Outcome/Assessments  Outcome Measures:   Quick Disabilities of the Arm, Shoulder and Hand: QuickDash Total Score (Score will not calculate if more then 2 questions are left blank): 65.91   (scored 0-100; a higher score indicates greater disability) see flowsheet for additional documentation        Treatment     Therapeutic Exercise  Initial evaluation completed  Education on treatment plan   Issued and completed HEP- see details below  Patient was fitted with an edema glove size right XS 3/4 finger glove to be worn during the night per MD and during the day as needed per OT as she feels it helps reduce the burning and off for hygiene, off if signs of intolerance noted.    Skilled input: verbal instruction/cues and as detailed above    Writer verbally educated and received verbal consent for hand placement, positioning of patient, and techniques to be performed today from patient for hand placement and palpation for techniques and therapist position for techniques as described above and how they are pertinent to the patient's plan of care.  Home Exercise Program  Access Code: [email or web address]  URL:  [email or web address]  Date: 08/06/2024  Prepared by: Mis Jaquez    Exercises  - Hand Fist Pumps  - 4 x daily - 7 x weekly - 1 sets - 10 reps - 1 hold  - Finger MP Flexion AROM  - 4 x daily - 7 x weekly - 1 sets - 10 reps - 1 hold  - Finger Spreading  - 4 x daily - 7 x weekly - 1 sets - 10 reps - 1 hold  - Seated Finger Composite Flexion Extension  - 4 x daily - 7 x weekly - 1 sets - 10 reps - 1 hold  - Seated Claw Fist AROM  - 4 x daily - 7 x weekly - 1 sets - 10 reps - 1 hold  - Passive Hook Fist  - 4 x daily - 7 x weekly - 1 sets - 5 reps - 20 hold  - Wrist AROM Flexion Extension  - 4 x daily - 7 x weekly - 1 sets - 10 reps - 1 hold  - Wrist AROM Radial Ulnar Deviation  - 4 x daily - 7 x weekly - 1 sets - 10 reps - 1 hold      ASSESSMENT                                                                                                          58 year old patient has reported functional limitations listed above impacted by signs and symptoms consistent with treatment diagnosis below.  Treatment Diagnosis:   - Involved: right hand, right wrist.  - Symptoms/impairments: increased pain/symptoms, impaired range of motion and impaired strength.    Patient presents with hand and wrist pain and stiffness especially limited into wrist extension and unable to make a tight fist along with pain with pressure to palm or movement with wrist and hand. This impacts functional use of hand for self care tasks with her dominant right hand. Patient will be seenf or 2x this week and then 1x/week.    Prognosis: patient will benefit from skilled therapy  Rehabilitative potential is: good.  Clinical decision making: Moderate - Patient has several limitations (3-5), comorbidities and/or complexities, as noted in detailed assessment above, that impact their occupational profile.  Resulting in several treatment options and minimal to moderate task modification consistent with moderate clinical decision making  complexity.  Education:   - Present and ready to learn: patient  - Results of above outlined education: Verbalizes understanding, Demonstrates understanding and Needs reinforcement    PLAN                                                                                                                         The following skilled interventions to be implemented to achieve goals listed below:  Activities of Daily Living/Self Care (82301)  Therapeutic Exercise (63716)  Heat/Cold (28341)  Manual Therapy (37074)  Ultrasound (31987)  Fluidotherapy/Whirlpool (48287)  Paraffin (76226)    Frequency / Duration  2 times per week tapering as patient progresses for 4 weeks for an estimated total of 8 visits    Patient involved in and agreed to plan of care and goals.  Attendance policy reviewed with patient.    Suggestions for next session as indicated: Progress per plan of care  Fluidotherapy or Moist hot pack  Soft tissue mobilization to hand, palm, digits and volar and dorsal forearm  Manual lymphatic drainage hand  Issued theraputty for Scar mobilization  Intrinsic stretch  Metacarpals joint mobilization  Radiocarpal joint mobilization  Wrist prom and stretching  Issue Wrist stretches bilateral into praying hands, wrist flexion and extension and ulnar and radial deviation x 10 each    Goals  Long Term Goals: to be met by end of plan of care   1. Patient will grasp 20+ lbs, with patient reported manageable/tolerable difficulty for completion of household tasks such as lifting groceries and performing meal prep.   2. Patient will pinch 7 lbs, with patient reported manageable/tolerable difficulty for completion of household tasks such as opening containers and lifting dishes.   3. Patient will complete upper body dressing and lower body  independently, with patient reported manageable/tolerable difficulty.    4. Patient will be independent with progressed and modified home exercise program.  5.  Quick DASH: Patient will score  51 or lower on The Quick DASH to indicate a decreased level of impairment with lifting, carrying, household and self-care activity. (minimal clinically important difference: 14 of calculated score)    This note sent for referring provider signature        Therapy procedure time and total treatment time can be found documented on the Time Entry flowsheet         Current Participants as of 8/6/2024    Name Type Comments Contact Info    Jeronimo Fournier MD Referring Provider  [phone]    Signature pending    Mis Jaquez OT Occupational Therapist  [phone]    Signature pending          RE: Plan of Care for Blaire Del Rosario, YOB: 1966     I certify the need for these services, furnished under this plan of treatment and while under my care.  I agree with the plan of care as stated and request that therapy proceed.        __________________________________________________________________________________  Provider Signature         Date

## 2025-03-12 ENCOUNTER — HOSPITAL ENCOUNTER (OUTPATIENT)
Dept: MAMMOGRAPHY | Facility: HOSPITAL | Age: 60
Discharge: HOME | End: 2025-03-12
Attending: OBSTETRICS & GYNECOLOGY
Payer: COMMERCIAL

## 2025-03-12 DIAGNOSIS — Z12.31 ENCOUNTER FOR SCREENING MAMMOGRAM FOR BREAST CANCER: ICD-10-CM

## 2025-03-12 PROCEDURE — 77067 SCR MAMMO BI INCL CAD: CPT | Performed by: OBSTETRICS & GYNECOLOGY

## 2025-03-12 PROCEDURE — 77063 BREAST TOMOSYNTHESIS BI: CPT | Performed by: OBSTETRICS & GYNECOLOGY

## 2025-04-08 ENCOUNTER — HOSPITAL ENCOUNTER (OUTPATIENT)
Dept: MAMMOGRAPHY | Facility: HOSPITAL | Age: 60
Discharge: HOME | End: 2025-04-08
Attending: OBSTETRICS & GYNECOLOGY
Payer: COMMERCIAL

## 2025-04-08 DIAGNOSIS — R92.2 INCONCLUSIVE MAMMOGRAM: ICD-10-CM

## 2025-04-08 PROCEDURE — 77061 BREAST TOMOSYNTHESIS UNI: CPT | Performed by: OBSTETRICS & GYNECOLOGY

## 2025-04-08 PROCEDURE — 77065 DX MAMMO INCL CAD UNI: CPT | Performed by: OBSTETRICS & GYNECOLOGY

## 2025-04-08 PROCEDURE — 76642 ULTRASOUND BREAST LIMITED: CPT | Performed by: OBSTETRICS & GYNECOLOGY

## 2025-07-07 NOTE — TELEPHONE ENCOUNTER
Patient aware of Dr Andrzej Castillo recommendation to refer to Dermotologist. If still having issues at her appointment on 6/4/21 she will take a look at it then. Understanding verbalized. N/A

## (undated) DEVICE — BREAST-HERNIA-PORT CDS-LF: Brand: MEDLINE INDUSTRIES, INC.

## (undated) DEVICE — LIGHT HANDLE

## (undated) DEVICE — UNDYED BRAIDED (POLYGLACTIN 910), SYNTHETIC ABSORBABLE SUTURE: Brand: COATED VICRYL

## (undated) DEVICE — 3M(TM) MICROPORE TAPE DISPENSER 1535-2: Brand: 3M™ MICROPORE™

## (undated) DEVICE — SCD SLEEVE KNEE HI BLEND

## (undated) DEVICE — SUTURE SILK 0 FSL

## (undated) DEVICE — VIOLET BRAIDED (POLYGLACTIN 910), SYNTHETIC ABSORBABLE SUTURE: Brand: COATED VICRYL

## (undated) DEVICE — STERILE POLYISOPRENE POWDER-FREE SURGICAL GLOVES: Brand: PROTEXIS

## (undated) DEVICE — MEGADYNE

## (undated) DEVICE — SOL  .9 1000ML BTL

## (undated) NOTE — LETTER
Date & Time: 11/21/2018, 11:18 PM  Patient: Cassidy Lawton  Encounter Provider(s):    Casa Sher MD       Occupational restrictions  For: Cassidy Lawton    Patient was seen in the emergency department on 11/21/2018.   Patient should avoid washing dish

## (undated) NOTE — Clinical Note
I had the pleasure of seeing Nunomarixajorge Nestor on 9/27/2021. Please see my attached note.     Juanito Monroy MD FACS  [facility]Surgery

## (undated) NOTE — LETTER
Ultrasound Guided Breast Biopsy is a procedure that utilizes a needle to sample tissue in a lesion that was discovered on your ultrasound.  This procedure uses an ultrasound machine to position the needle at the location of the lesion for multiple core samp enough samples have been taken, a titanium clip is inserted through the needle to maeve the site of the biopsy. 6. The needle will be removed from the breast and the technologist will apply pressure for 10-15 minutes to prevent bleeding.           7. A lig

## (undated) NOTE — Clinical Note
I had the pleasure of seeing Suzanne Cee on 11/23/2021. Please see my attached note.     Jacob Dyson MD FACS  [facility]Surgery

## (undated) NOTE — ED AVS SNAPSHOT
Laron Braun   MRN: [id number]    Department:  [facility] Emergency Department   Date of Visit:  11/21/2018           Disclosure     Insurance plans vary and the physician(s) referred by the ER may not be covered by your plan.  Please contact y tell this physician (or your personal doctor if your instructions are to return to your personal doctor) about any new or lasting problems. The primary care or specialist physician will see patients referred from the [facility] Emergency Department.  Issac Rutherford

## (undated) NOTE — LETTER
Stereotactic Breast Biopsy is a procedure that utilizes mammography and a computer to sample tissue from a suspicious area of micro calcifications or lesion identified on a mammogram. The computer guides a needle into the area for multiple core samples.  Jean Paul Escalona has taken effect, a small incision will be made in the breast. This will allow for the needle to enter the breast with less resistance.       6. Once the needle is in place, another set of stereo films will be taken to ensure that the needle is exactly wher procedure, please call the Breast Care Coordinator Nurse at [phone]. (M-F 7:30-4:00p)      SO CB 8/19